# Patient Record
Sex: MALE | Race: WHITE | Employment: FULL TIME | URBAN - METROPOLITAN AREA
[De-identification: names, ages, dates, MRNs, and addresses within clinical notes are randomized per-mention and may not be internally consistent; named-entity substitution may affect disease eponyms.]

---

## 2017-02-17 ENCOUNTER — TELEPHONE (OUTPATIENT)
Dept: FAMILY MEDICINE CLINIC | Age: 57
End: 2017-02-17

## 2017-02-17 ENCOUNTER — OFFICE VISIT (OUTPATIENT)
Dept: FAMILY MEDICINE CLINIC | Age: 57
End: 2017-02-17

## 2017-02-17 VITALS
BODY MASS INDEX: 24.79 KG/M2 | HEIGHT: 72 IN | TEMPERATURE: 97.4 F | WEIGHT: 183 LBS | DIASTOLIC BLOOD PRESSURE: 85 MMHG | HEART RATE: 64 BPM | RESPIRATION RATE: 12 BRPM | SYSTOLIC BLOOD PRESSURE: 130 MMHG | OXYGEN SATURATION: 98 %

## 2017-02-17 DIAGNOSIS — G51.0 BELL'S PALSY: Primary | ICD-10-CM

## 2017-02-17 PROCEDURE — G8427 DOCREV CUR MEDS BY ELIG CLIN: HCPCS | Performed by: FAMILY MEDICINE

## 2017-02-17 PROCEDURE — G8419 CALC BMI OUT NRM PARAM NOF/U: HCPCS | Performed by: FAMILY MEDICINE

## 2017-02-17 PROCEDURE — 1036F TOBACCO NON-USER: CPT | Performed by: FAMILY MEDICINE

## 2017-02-17 PROCEDURE — 3017F COLORECTAL CA SCREEN DOC REV: CPT | Performed by: FAMILY MEDICINE

## 2017-02-17 PROCEDURE — G8484 FLU IMMUNIZE NO ADMIN: HCPCS | Performed by: FAMILY MEDICINE

## 2017-02-17 PROCEDURE — 99213 OFFICE O/P EST LOW 20 MIN: CPT | Performed by: FAMILY MEDICINE

## 2017-02-17 RX ORDER — ACYCLOVIR 400 MG/1
400 TABLET ORAL
Qty: 35 TABLET | Refills: 0 | Status: SHIPPED | OUTPATIENT
Start: 2017-02-17 | End: 2017-02-24

## 2017-02-17 RX ORDER — PREDNISONE 10 MG/1
TABLET ORAL
Qty: 55 TABLET | Refills: 0 | Status: SHIPPED | OUTPATIENT
Start: 2017-02-17 | End: 2018-06-08

## 2017-02-17 RX ORDER — MINERAL OIL, PETROLATUM 425; 568 MG/G; MG/G
1 OINTMENT OPHTHALMIC NIGHTLY PRN
Qty: 1 TUBE | Refills: 3 | Status: SHIPPED | OUTPATIENT
Start: 2017-02-17 | End: 2018-06-08

## 2017-02-17 ASSESSMENT — ENCOUNTER SYMPTOMS
RESPIRATORY NEGATIVE: 1
FACIAL SWELLING: 0
EYE REDNESS: 1
EYE ITCHING: 0
TROUBLE SWALLOWING: 0
EYE DISCHARGE: 0
SINUS PRESSURE: 1
SORE THROAT: 1
RHINORRHEA: 1

## 2017-11-01 ENCOUNTER — OFFICE VISIT (OUTPATIENT)
Dept: FAMILY MEDICINE CLINIC | Age: 57
End: 2017-11-01

## 2017-11-01 VITALS
HEIGHT: 72 IN | TEMPERATURE: 96.3 F | DIASTOLIC BLOOD PRESSURE: 82 MMHG | WEIGHT: 186 LBS | SYSTOLIC BLOOD PRESSURE: 122 MMHG | HEART RATE: 60 BPM | BODY MASS INDEX: 25.19 KG/M2 | RESPIRATION RATE: 15 BRPM

## 2017-11-01 DIAGNOSIS — G50.0 TRIGEMINAL NEURALGIA OF LEFT SIDE OF FACE: Primary | ICD-10-CM

## 2017-11-01 PROCEDURE — 1036F TOBACCO NON-USER: CPT | Performed by: FAMILY MEDICINE

## 2017-11-01 PROCEDURE — G8419 CALC BMI OUT NRM PARAM NOF/U: HCPCS | Performed by: FAMILY MEDICINE

## 2017-11-01 PROCEDURE — G8484 FLU IMMUNIZE NO ADMIN: HCPCS | Performed by: FAMILY MEDICINE

## 2017-11-01 PROCEDURE — G8428 CUR MEDS NOT DOCUMENT: HCPCS | Performed by: FAMILY MEDICINE

## 2017-11-01 PROCEDURE — 3017F COLORECTAL CA SCREEN DOC REV: CPT | Performed by: FAMILY MEDICINE

## 2017-11-01 PROCEDURE — 99213 OFFICE O/P EST LOW 20 MIN: CPT | Performed by: FAMILY MEDICINE

## 2017-11-01 RX ORDER — GABAPENTIN 300 MG/1
300 CAPSULE ORAL 3 TIMES DAILY
Qty: 90 CAPSULE | Refills: 3 | Status: SHIPPED | OUTPATIENT
Start: 2017-11-01 | End: 2018-05-03 | Stop reason: SDUPTHER

## 2017-11-01 ASSESSMENT — ENCOUNTER SYMPTOMS: RESPIRATORY NEGATIVE: 1

## 2017-11-01 NOTE — PROGRESS NOTES
Subjective  Angela Lui, 61 y.o. male presents today with:  Chief Complaint   Patient presents with    Facial Pain       Comes into the office today complaining of increased facial pain and twitching ever since having Bell's palsy on the left side. Review of Systems   Constitutional: Negative. Respiratory: Negative. Cardiovascular: Negative. Musculoskeletal: Negative. Neurological:        Facial twitching and pain-left-sided in same distribution as prior Bell's palsy         Past Medical History:   Diagnosis Date    Headache      Past Surgical History:   Procedure Laterality Date    SPINE SURGERY      TONSILLECTOMY  child     Social History     Social History    Marital status:      Spouse name: N/A    Number of children: N/A    Years of education: N/A     Occupational History    Not on file. Social History Main Topics    Smoking status: Never Smoker    Smokeless tobacco: Not on file    Alcohol use No    Drug use: No    Sexual activity: Not on file     Other Topics Concern    Not on file     Social History Narrative    No narrative on file     Family History   Problem Relation Age of Onset    Cancer Mother      breast    Arthritis Father      No Known Allergies  Current Outpatient Prescriptions on File Prior to Visit   Medication Sig Dispense Refill    predniSONE (DELTASONE) 10 MG tablet Po 4 tabs daily x 5 days then 3d x 5d then 2d x 5d then 1d x 5d then 1/2 daily x 5d then 1/2 every other day x 5 doses then stop 55 tablet 0    Artificial Tear Ointment (REFRESH LACRI-LUBE) OINT ointment Apply 1 inch to eye nightly as needed (dry eye) 1 Tube 3    ibuprofen (ADVIL;MOTRIN) 800 MG tablet Take 800 mg by mouth every 6 hours as needed        No current facility-administered medications on file prior to visit.         Objective    Vitals:    11/01/17 1142   BP: 122/82   Pulse: 60   Resp: 15   Temp: 96.3 °F (35.7 °C)   TempSrc: Tympanic   Weight: 186 lb (84.4 kg)   Height: 5' 11.5\" (1.816 m)     Physical Exam   Constitutional: He is oriented to person, place, and time. He appears well-developed and well-nourished. No distress. HENT:   Head: Normocephalic and atraumatic. Cardiovascular: Normal rate and regular rhythm. Pulmonary/Chest: Effort normal and breath sounds normal.   Abdominal: Soft. Bowel sounds are normal.   Neurological: He is alert and oriented to person, place, and time. Skin: Skin is warm and dry. He is not diaphoretic. Nursing note and vitals reviewed. Assessment & Plan   1. Trigeminal neuralgia of left side of face  gabapentin (NEURONTIN) 300 MG capsule     No orders of the defined types were placed in this encounter. Orders Placed This Encounter   Medications    gabapentin (NEURONTIN) 300 MG capsule     Sig: Take 1 capsule by mouth 3 times daily     Dispense:  90 capsule     Refill:  3   May adjust dosage as necessary. The goal is lowest effective dosage. Keep next regular appointment  There are no discontinued medications. Counseling given: Not Answered      No Follow-up on file.     Gregg Leone DO

## 2018-05-03 DIAGNOSIS — G50.0 TRIGEMINAL NEURALGIA OF LEFT SIDE OF FACE: ICD-10-CM

## 2018-05-03 RX ORDER — GABAPENTIN 300 MG/1
300 CAPSULE ORAL 3 TIMES DAILY
Qty: 90 CAPSULE | Refills: 0 | Status: SHIPPED | OUTPATIENT
Start: 2018-05-03 | End: 2018-07-31 | Stop reason: SDUPTHER

## 2018-06-08 ENCOUNTER — OFFICE VISIT (OUTPATIENT)
Dept: FAMILY MEDICINE CLINIC | Age: 58
End: 2018-06-08
Payer: COMMERCIAL

## 2018-06-08 VITALS
BODY MASS INDEX: 24.52 KG/M2 | RESPIRATION RATE: 12 BRPM | OXYGEN SATURATION: 98 % | WEIGHT: 181 LBS | HEART RATE: 63 BPM | SYSTOLIC BLOOD PRESSURE: 130 MMHG | HEIGHT: 72 IN | TEMPERATURE: 96.2 F | DIASTOLIC BLOOD PRESSURE: 90 MMHG

## 2018-06-08 DIAGNOSIS — Z12.11 COLON CANCER SCREENING: ICD-10-CM

## 2018-06-08 DIAGNOSIS — K40.90 INGUINAL HERNIA OF RIGHT SIDE WITHOUT OBSTRUCTION OR GANGRENE: Primary | ICD-10-CM

## 2018-06-08 PROCEDURE — 1036F TOBACCO NON-USER: CPT | Performed by: FAMILY MEDICINE

## 2018-06-08 PROCEDURE — G8420 CALC BMI NORM PARAMETERS: HCPCS | Performed by: FAMILY MEDICINE

## 2018-06-08 PROCEDURE — G8427 DOCREV CUR MEDS BY ELIG CLIN: HCPCS | Performed by: FAMILY MEDICINE

## 2018-06-08 PROCEDURE — 99213 OFFICE O/P EST LOW 20 MIN: CPT | Performed by: FAMILY MEDICINE

## 2018-06-08 PROCEDURE — 3017F COLORECTAL CA SCREEN DOC REV: CPT | Performed by: FAMILY MEDICINE

## 2018-06-08 ASSESSMENT — PATIENT HEALTH QUESTIONNAIRE - PHQ9
SUM OF ALL RESPONSES TO PHQ9 QUESTIONS 1 & 2: 0
SUM OF ALL RESPONSES TO PHQ QUESTIONS 1-9: 0
2. FEELING DOWN, DEPRESSED OR HOPELESS: 0
1. LITTLE INTEREST OR PLEASURE IN DOING THINGS: 0

## 2018-06-08 ASSESSMENT — ENCOUNTER SYMPTOMS
GASTROINTESTINAL NEGATIVE: 1
RESPIRATORY NEGATIVE: 1

## 2024-02-08 ENCOUNTER — HOSPITAL ENCOUNTER (INPATIENT)
Facility: HOSPITAL | Age: 64
LOS: 5 days | Discharge: HOME | DRG: 373 | End: 2024-02-13
Attending: EMERGENCY MEDICINE | Admitting: SURGERY
Payer: COMMERCIAL

## 2024-02-08 ENCOUNTER — APPOINTMENT (OUTPATIENT)
Dept: RADIOLOGY | Facility: HOSPITAL | Age: 64
DRG: 373 | End: 2024-02-08
Payer: COMMERCIAL

## 2024-02-08 ENCOUNTER — OUTSIDE SERVICES (OUTPATIENT)
Dept: INFECTIOUS DISEASES | Age: 64
End: 2024-02-08

## 2024-02-08 DIAGNOSIS — K35.211 ACUTE APPENDICITIS WITH PERFORATION, GENERALIZED PERITONITIS, AND ABSCESS, WITHOUT GANGRENE: Primary | ICD-10-CM

## 2024-02-08 DIAGNOSIS — K35.211 ACUTE APPENDICITIS WITH PERFORATION, GENERALIZED PERITONITIS, AND ABSCESS, UNSPECIFIED WHETHER GANGRENE PRESENT: ICD-10-CM

## 2024-02-08 DIAGNOSIS — K65.9 PERITONITIS (HCC): Primary | ICD-10-CM

## 2024-02-08 LAB
ALBUMIN SERPL BCP-MCNC: 3.8 G/DL (ref 3.4–5)
ALP SERPL-CCNC: 85 U/L (ref 33–136)
ALT SERPL W P-5'-P-CCNC: 43 U/L (ref 10–52)
ANION GAP SERPL CALC-SCNC: 13 MMOL/L (ref 10–20)
APPEARANCE UR: CLEAR
AST SERPL W P-5'-P-CCNC: 27 U/L (ref 9–39)
BASOPHILS # BLD AUTO: 0.04 X10*3/UL (ref 0–0.1)
BASOPHILS NFR BLD AUTO: 0.2 %
BILIRUB SERPL-MCNC: 1.2 MG/DL (ref 0–1.2)
BILIRUB UR STRIP.AUTO-MCNC: NEGATIVE MG/DL
BUN SERPL-MCNC: 14 MG/DL (ref 6–23)
CALCIUM SERPL-MCNC: 9 MG/DL (ref 8.6–10.3)
CHLORIDE SERPL-SCNC: 94 MMOL/L (ref 98–107)
CO2 SERPL-SCNC: 26 MMOL/L (ref 21–32)
COLOR UR: YELLOW
CREAT SERPL-MCNC: 0.94 MG/DL (ref 0.5–1.3)
EGFRCR SERPLBLD CKD-EPI 2021: >90 ML/MIN/1.73M*2
EOSINOPHIL # BLD AUTO: 0.01 X10*3/UL (ref 0–0.7)
EOSINOPHIL NFR BLD AUTO: 0.1 %
ERYTHROCYTE [DISTWIDTH] IN BLOOD BY AUTOMATED COUNT: 12.2 % (ref 11.5–14.5)
FLUAV RNA RESP QL NAA+PROBE: DETECTED
FLUBV RNA RESP QL NAA+PROBE: NOT DETECTED
GLUCOSE SERPL-MCNC: 104 MG/DL (ref 74–99)
GLUCOSE UR STRIP.AUTO-MCNC: NEGATIVE MG/DL
HCT VFR BLD AUTO: 42.4 % (ref 41–52)
HGB BLD-MCNC: 15.1 G/DL (ref 13.5–17.5)
HOLD SPECIMEN: NORMAL
IMM GRANULOCYTES # BLD AUTO: 0.09 X10*3/UL (ref 0–0.7)
IMM GRANULOCYTES NFR BLD AUTO: 0.5 % (ref 0–0.9)
KETONES UR STRIP.AUTO-MCNC: ABNORMAL MG/DL
LEUKOCYTE ESTERASE UR QL STRIP.AUTO: NEGATIVE
LIPASE SERPL-CCNC: 34 U/L (ref 9–82)
LYMPHOCYTES # BLD AUTO: 0.98 X10*3/UL (ref 1.2–4.8)
LYMPHOCYTES NFR BLD AUTO: 5.9 %
MCH RBC QN AUTO: 30 PG (ref 26–34)
MCHC RBC AUTO-ENTMCNC: 35.6 G/DL (ref 32–36)
MCV RBC AUTO: 84 FL (ref 80–100)
MONOCYTES # BLD AUTO: 1.2 X10*3/UL (ref 0.1–1)
MONOCYTES NFR BLD AUTO: 7.2 %
MUCOUS THREADS #/AREA URNS AUTO: ABNORMAL /LPF
NEUTROPHILS # BLD AUTO: 14.32 X10*3/UL (ref 1.2–7.7)
NEUTROPHILS NFR BLD AUTO: 86.1 %
NITRITE UR QL STRIP.AUTO: NEGATIVE
NRBC BLD-RTO: 0 /100 WBCS (ref 0–0)
PH UR STRIP.AUTO: 6 [PH]
PLATELET # BLD AUTO: 232 X10*3/UL (ref 150–450)
POTASSIUM SERPL-SCNC: 3.6 MMOL/L (ref 3.5–5.3)
PROT SERPL-MCNC: 7.2 G/DL (ref 6.4–8.2)
PROT UR STRIP.AUTO-MCNC: ABNORMAL MG/DL
RBC # BLD AUTO: 5.03 X10*6/UL (ref 4.5–5.9)
RBC # UR STRIP.AUTO: ABNORMAL /UL
RBC #/AREA URNS AUTO: ABNORMAL /HPF
SODIUM SERPL-SCNC: 129 MMOL/L (ref 136–145)
SP GR UR STRIP.AUTO: 1.01
UROBILINOGEN UR STRIP.AUTO-MCNC: <2 MG/DL
WBC # BLD AUTO: 16.6 X10*3/UL (ref 4.4–11.3)
WBC #/AREA URNS AUTO: ABNORMAL /HPF

## 2024-02-08 PROCEDURE — 2500000004 HC RX 250 GENERAL PHARMACY W/ HCPCS (ALT 636 FOR OP/ED): Performed by: EMERGENCY MEDICINE

## 2024-02-08 PROCEDURE — 99222 1ST HOSP IP/OBS MODERATE 55: CPT

## 2024-02-08 PROCEDURE — 74176 CT ABD & PELVIS W/O CONTRAST: CPT | Performed by: RADIOLOGY

## 2024-02-08 PROCEDURE — 99223 1ST HOSP IP/OBS HIGH 75: CPT | Performed by: SURGERY

## 2024-02-08 PROCEDURE — 85025 COMPLETE CBC W/AUTO DIFF WBC: CPT | Performed by: EMERGENCY MEDICINE

## 2024-02-08 PROCEDURE — 81001 URINALYSIS AUTO W/SCOPE: CPT | Performed by: EMERGENCY MEDICINE

## 2024-02-08 PROCEDURE — 36415 COLL VENOUS BLD VENIPUNCTURE: CPT | Performed by: EMERGENCY MEDICINE

## 2024-02-08 PROCEDURE — 87502 INFLUENZA DNA AMP PROBE: CPT | Performed by: INTERNAL MEDICINE

## 2024-02-08 PROCEDURE — 2500000004 HC RX 250 GENERAL PHARMACY W/ HCPCS (ALT 636 FOR OP/ED)

## 2024-02-08 PROCEDURE — 83690 ASSAY OF LIPASE: CPT | Performed by: EMERGENCY MEDICINE

## 2024-02-08 PROCEDURE — 1210000001 HC SEMI-PRIVATE ROOM DAILY

## 2024-02-08 PROCEDURE — 99285 EMERGENCY DEPT VISIT HI MDM: CPT | Mod: 25 | Performed by: EMERGENCY MEDICINE

## 2024-02-08 PROCEDURE — 74176 CT ABD & PELVIS W/O CONTRAST: CPT

## 2024-02-08 PROCEDURE — 96374 THER/PROPH/DIAG INJ IV PUSH: CPT

## 2024-02-08 PROCEDURE — 80053 COMPREHEN METABOLIC PANEL: CPT | Performed by: EMERGENCY MEDICINE

## 2024-02-08 RX ORDER — MORPHINE SULFATE 2 MG/ML
2 INJECTION, SOLUTION INTRAMUSCULAR; INTRAVENOUS EVERY 4 HOURS PRN
Status: DISCONTINUED | OUTPATIENT
Start: 2024-02-08 | End: 2024-02-11

## 2024-02-08 RX ORDER — ACETAMINOPHEN 160 MG/5ML
650 SOLUTION ORAL EVERY 4 HOURS PRN
Status: DISCONTINUED | OUTPATIENT
Start: 2024-02-08 | End: 2024-02-13 | Stop reason: HOSPADM

## 2024-02-08 RX ORDER — ACETAMINOPHEN 650 MG/1
650 SUPPOSITORY RECTAL EVERY 4 HOURS PRN
Status: DISCONTINUED | OUTPATIENT
Start: 2024-02-08 | End: 2024-02-13 | Stop reason: HOSPADM

## 2024-02-08 RX ORDER — ACETAMINOPHEN 325 MG/1
650 TABLET ORAL EVERY 4 HOURS PRN
Status: DISCONTINUED | OUTPATIENT
Start: 2024-02-08 | End: 2024-02-13 | Stop reason: HOSPADM

## 2024-02-08 RX ORDER — ONDANSETRON HYDROCHLORIDE 2 MG/ML
4 INJECTION, SOLUTION INTRAVENOUS ONCE
Status: DISCONTINUED | OUTPATIENT
Start: 2024-02-08 | End: 2024-02-11

## 2024-02-08 RX ORDER — ONDANSETRON 4 MG/1
4 TABLET, ORALLY DISINTEGRATING ORAL EVERY 8 HOURS PRN
Status: DISCONTINUED | OUTPATIENT
Start: 2024-02-08 | End: 2024-02-13 | Stop reason: HOSPADM

## 2024-02-08 RX ORDER — ONDANSETRON HYDROCHLORIDE 2 MG/ML
4 INJECTION, SOLUTION INTRAVENOUS EVERY 8 HOURS PRN
Status: DISCONTINUED | OUTPATIENT
Start: 2024-02-08 | End: 2024-02-13 | Stop reason: HOSPADM

## 2024-02-08 RX ORDER — SODIUM CHLORIDE, SODIUM LACTATE, POTASSIUM CHLORIDE, CALCIUM CHLORIDE 600; 310; 30; 20 MG/100ML; MG/100ML; MG/100ML; MG/100ML
100 INJECTION, SOLUTION INTRAVENOUS CONTINUOUS
Status: DISCONTINUED | OUTPATIENT
Start: 2024-02-08 | End: 2024-02-11

## 2024-02-08 RX ORDER — ACETAMINOPHEN 500 MG
5 TABLET ORAL NIGHTLY PRN
COMMUNITY

## 2024-02-08 RX ORDER — IBUPROFEN 200 MG
800 TABLET ORAL EVERY 6 HOURS PRN
COMMUNITY

## 2024-02-08 RX ORDER — POLYETHYLENE GLYCOL 3350 17 G/17G
17 POWDER, FOR SOLUTION ORAL DAILY PRN
COMMUNITY

## 2024-02-08 RX ORDER — MORPHINE SULFATE 4 MG/ML
4 INJECTION, SOLUTION INTRAMUSCULAR; INTRAVENOUS EVERY 4 HOURS PRN
Status: DISCONTINUED | OUTPATIENT
Start: 2024-02-08 | End: 2024-02-11

## 2024-02-08 RX ORDER — AZITHROMYCIN 250 MG/1
250 TABLET, FILM COATED ORAL DAILY
COMMUNITY
Start: 2024-02-05 | End: 2024-02-13 | Stop reason: HOSPADM

## 2024-02-08 RX ADMIN — SODIUM CHLORIDE 10 ML: 9 INJECTION, SOLUTION INTRAVENOUS at 13:48

## 2024-02-08 RX ADMIN — ACETAMINOPHEN 650 MG: 325 TABLET ORAL at 13:57

## 2024-02-08 RX ADMIN — PIPERACILLIN SODIUM AND TAZOBACTAM SODIUM 3.38 G: 3; .375 INJECTION, SOLUTION INTRAVENOUS at 21:16

## 2024-02-08 RX ADMIN — SODIUM CHLORIDE, POTASSIUM CHLORIDE, SODIUM LACTATE AND CALCIUM CHLORIDE 100 ML/HR: 600; 310; 30; 20 INJECTION, SOLUTION INTRAVENOUS at 13:29

## 2024-02-08 RX ADMIN — PIPERACILLIN SODIUM AND TAZOBACTAM SODIUM 3.38 G: 3; .375 INJECTION, SOLUTION INTRAVENOUS at 13:48

## 2024-02-08 RX ADMIN — SODIUM CHLORIDE 10 ML: 9 INJECTION, SOLUTION INTRAVENOUS at 21:19

## 2024-02-08 RX ADMIN — PIPERACILLIN AND TAZOBACTAM 4.5 G: 4; .5 INJECTION, POWDER, FOR SOLUTION INTRAVENOUS at 09:06

## 2024-02-08 RX ADMIN — ACETAMINOPHEN 650 MG: 325 TABLET ORAL at 21:18

## 2024-02-08 RX ADMIN — SODIUM CHLORIDE 1000 ML: 9 INJECTION, SOLUTION INTRAVENOUS at 08:07

## 2024-02-08 RX ADMIN — SODIUM CHLORIDE, POTASSIUM CHLORIDE, SODIUM LACTATE AND CALCIUM CHLORIDE 100 ML/HR: 600; 310; 30; 20 INJECTION, SOLUTION INTRAVENOUS at 23:44

## 2024-02-08 SDOH — SOCIAL STABILITY: SOCIAL INSECURITY: ABUSE: ADULT

## 2024-02-08 SDOH — SOCIAL STABILITY: SOCIAL INSECURITY: ARE THERE ANY APPARENT SIGNS OF INJURIES/BEHAVIORS THAT COULD BE RELATED TO ABUSE/NEGLECT?: NO

## 2024-02-08 SDOH — SOCIAL STABILITY: SOCIAL INSECURITY: ARE YOU OR HAVE YOU BEEN THREATENED OR ABUSED PHYSICALLY, EMOTIONALLY, OR SEXUALLY BY ANYONE?: NO

## 2024-02-08 SDOH — SOCIAL STABILITY: SOCIAL INSECURITY: HAS ANYONE EVER THREATENED TO HURT YOUR FAMILY OR YOUR PETS?: NO

## 2024-02-08 SDOH — SOCIAL STABILITY: SOCIAL INSECURITY: DO YOU FEEL ANYONE HAS EXPLOITED OR TAKEN ADVANTAGE OF YOU FINANCIALLY OR OF YOUR PERSONAL PROPERTY?: NO

## 2024-02-08 SDOH — SOCIAL STABILITY: SOCIAL INSECURITY: DO YOU FEEL UNSAFE GOING BACK TO THE PLACE WHERE YOU ARE LIVING?: NO

## 2024-02-08 SDOH — SOCIAL STABILITY: SOCIAL INSECURITY: WERE YOU ABLE TO COMPLETE ALL THE BEHAVIORAL HEALTH SCREENINGS?: YES

## 2024-02-08 SDOH — SOCIAL STABILITY: SOCIAL INSECURITY: DOES ANYONE TRY TO KEEP YOU FROM HAVING/CONTACTING OTHER FRIENDS OR DOING THINGS OUTSIDE YOUR HOME?: NO

## 2024-02-08 SDOH — SOCIAL STABILITY: SOCIAL INSECURITY: HAVE YOU HAD THOUGHTS OF HARMING ANYONE ELSE?: NO

## 2024-02-08 ASSESSMENT — ACTIVITIES OF DAILY LIVING (ADL)
WALKS IN HOME: INDEPENDENT
PATIENT'S MEMORY ADEQUATE TO SAFELY COMPLETE DAILY ACTIVITIES?: YES
BATHING: INDEPENDENT
ADEQUATE_TO_COMPLETE_ADL: YES
FEEDING YOURSELF: INDEPENDENT
DRESSING YOURSELF: INDEPENDENT
ADEQUATE_TO_COMPLETE_ADL: YES
WALKS IN HOME: INDEPENDENT
HEARING - RIGHT EAR: FUNCTIONAL
GROOMING: INDEPENDENT
LACK_OF_TRANSPORTATION: NO
HEARING - RIGHT EAR: FUNCTIONAL
HEARING - LEFT EAR: FUNCTIONAL
PATIENT'S MEMORY ADEQUATE TO SAFELY COMPLETE DAILY ACTIVITIES?: YES
FEEDING YOURSELF: INDEPENDENT
BATHING: INDEPENDENT
JUDGMENT_ADEQUATE_SAFELY_COMPLETE_DAILY_ACTIVITIES: YES
GROOMING: INDEPENDENT
JUDGMENT_ADEQUATE_SAFELY_COMPLETE_DAILY_ACTIVITIES: YES
DRESSING YOURSELF: INDEPENDENT
TOILETING: INDEPENDENT
ASSISTIVE_DEVICE: EYEGLASSES
HEARING - LEFT EAR: FUNCTIONAL
ASSISTIVE_DEVICE: EYEGLASSES

## 2024-02-08 ASSESSMENT — LIFESTYLE VARIABLES
AUDIT-C TOTAL SCORE: 1
EVER HAD A DRINK FIRST THING IN THE MORNING TO STEADY YOUR NERVES TO GET RID OF A HANGOVER: NO
PRESCIPTION_ABUSE_PAST_12_MONTHS: NO
HAVE YOU EVER FELT YOU SHOULD CUT DOWN ON YOUR DRINKING: NO
AUDIT-C TOTAL SCORE: 1
HOW OFTEN DO YOU HAVE 6 OR MORE DRINKS ON ONE OCCASION: NEVER
SKIP TO QUESTIONS 9-10: 1
HOW MANY STANDARD DRINKS CONTAINING ALCOHOL DO YOU HAVE ON A TYPICAL DAY: 1 OR 2
SUBSTANCE_ABUSE_PAST_12_MONTHS: NO
HAVE PEOPLE ANNOYED YOU BY CRITICIZING YOUR DRINKING: NO
HOW OFTEN DO YOU HAVE A DRINK CONTAINING ALCOHOL: MONTHLY OR LESS
EVER FELT BAD OR GUILTY ABOUT YOUR DRINKING: NO

## 2024-02-08 ASSESSMENT — PAIN SCALES - GENERAL
PAINLEVEL_OUTOF10: 3
PAINLEVEL_OUTOF10: 3
PAINLEVEL_OUTOF10: 0 - NO PAIN
PAINLEVEL_OUTOF10: 1

## 2024-02-08 ASSESSMENT — COGNITIVE AND FUNCTIONAL STATUS - GENERAL
PATIENT BASELINE BEDBOUND: NO
DAILY ACTIVITIY SCORE: 24
MOBILITY SCORE: 24
MOBILITY SCORE: 24
DAILY ACTIVITIY SCORE: 24

## 2024-02-08 ASSESSMENT — ENCOUNTER SYMPTOMS
APPETITE CHANGE: 1
WEAKNESS: 1
FEVER: 1
DYSURIA: 0
TROUBLE SWALLOWING: 0
SORE THROAT: 0
SHORTNESS OF BREATH: 0
ABDOMINAL PAIN: 1
MYALGIAS: 0
NAUSEA: 1
CONSTIPATION: 1
BLOOD IN STOOL: 0
FATIGUE: 1

## 2024-02-08 ASSESSMENT — PAIN - FUNCTIONAL ASSESSMENT
PAIN_FUNCTIONAL_ASSESSMENT: 0-10

## 2024-02-08 ASSESSMENT — COLUMBIA-SUICIDE SEVERITY RATING SCALE - C-SSRS
1. IN THE PAST MONTH, HAVE YOU WISHED YOU WERE DEAD OR WISHED YOU COULD GO TO SLEEP AND NOT WAKE UP?: NO
2. HAVE YOU ACTUALLY HAD ANY THOUGHTS OF KILLING YOURSELF?: NO
6. HAVE YOU EVER DONE ANYTHING, STARTED TO DO ANYTHING, OR PREPARED TO DO ANYTHING TO END YOUR LIFE?: NO

## 2024-02-08 ASSESSMENT — PATIENT HEALTH QUESTIONNAIRE - PHQ9
2. FEELING DOWN, DEPRESSED OR HOPELESS: NOT AT ALL
SUM OF ALL RESPONSES TO PHQ9 QUESTIONS 1 & 2: 0
1. LITTLE INTEREST OR PLEASURE IN DOING THINGS: NOT AT ALL

## 2024-02-08 ASSESSMENT — PAIN DESCRIPTION - LOCATION: LOCATION: ABDOMEN

## 2024-02-08 NOTE — NURSING NOTE
Order received for drain placement on this pt. CT reveiwed by Dr Arora our radiologist and he d/w Dr Samuel. Agreement made to get get a follow-up CT tomorrow and no drain placement today. Dr Arora states that there is most likely stool and a percutaneous drain most likely would not drain that  type matter.

## 2024-02-08 NOTE — CONSULTS
"Nutrition Initial Assessment:   Nutrition Assessment    Reason for Assessment: Admission nursing screening    Patient is a 63 y.o. male presenting with:      Nutrition History:  Food and Nutrient History: RDN consult for MST score of 2. Pt reports 5 lb wt loss in the past 1 weeks due to poor appetite, nausea and diarrhea. Pt states appetite has been poor for the past 8 days. Stated  lbs.  Pt denies following diet restrictions at home.  Reports ongoing nausea and vomiting. Pt denies difficulty chewing or swallowing. Pt declined offer for clear liquid supplement, will continue to monitor.  Food Allergies/Intolerances:  None  GI Symptoms: Diarrhea and Nausea  Oral Problems: None       Anthropometrics:  Height: 180.3 cm (5' 11\")   Weight: 80.3 kg (177 lb)   BMI (Calculated): 24.7  IBW/kg (Dietitian Calculated): 78.2 kg  Percent of IBW: 103 %       Weight History:   Wt Readings from Last 10 Encounters:   02/08/24 80.3 kg (177 lb)   10/14/22 80.7 kg (178 lb)   10/12/22 80.7 kg (178 lb)   02/22/22 79.9 kg (176 lb 2 oz)   02/09/22 81.8 kg (180 lb 5.4 oz)   01/15/22 81.2 kg (179 lb)   01/11/22 80.5 kg (177 lb 6 oz)   08/04/21 79.9 kg (176 lb 2 oz)   07/07/21 80.9 kg (178 lb 4 oz)   04/02/21 81.6 kg (180 lb)      Weight Change %:  Weight History / % Weight Change: Pt reports 5 lb wt loss x 1 week, current wt reflects 1 lb nonsignificant wt loss from stated UBW    Nutrition Focused Physical Exam Findings:    Subcutaneous Fat Loss:   Orbital Fat Pads: Well nourshed (slightly bulging fat pads)  Buccal Fat Pads: Well nourished (full, rounded cheeks)  Triceps: Well nourished (ample fat tissue)  Muscle Wasting:  Temporalis: Well nourished (well-defined muscle)  Pectoralis (Clavicular Region): Well nourished (clavicle not visible)  Deltoid/Trapezius: Well nourished (rounded appearance at arm, shoulder, neck)  Edema:  Edema: none  Physical Findings:  Skin: Negative    Nutrition Significant Labs:  CBC Trend:   Results from last " 7 days   Lab Units 02/08/24  0803   WBC AUTO x10*3/uL 16.6*   RBC AUTO x10*6/uL 5.03   HEMOGLOBIN g/dL 15.1   HEMATOCRIT % 42.4   MCV fL 84   PLATELETS AUTO x10*3/uL 232    , BMP Trend:   Results from last 7 days   Lab Units 02/08/24  0803   GLUCOSE mg/dL 104*   CALCIUM mg/dL 9.0   SODIUM mmol/L 129*   POTASSIUM mmol/L 3.6   CO2 mmol/L 26   CHLORIDE mmol/L 94*   BUN mg/dL 14   CREATININE mg/dL 0.94    , BG POCT trend:        Nutrition Specific Medications:  reviewed    I/O:    ;          Dietary Orders (From admission, onward)       Start     Ordered    02/08/24 1347  Adult diet Clear Liquid  Diet effective now        Question:  Diet type  Answer:  Clear Liquid    02/08/24 1346                     Estimated Needs:   Total Energy Estimated Needs (kCal): 2000 kCal  Method for Estimating Needs: 25 kcal/kg ABW  Total Protein Estimated Needs (g): 80 g  Method for Estimating Needs: 1 g/kg ABW  Total Fluid Estimated Needs (mL): 2000 mL  Method for Estimating Needs: 1 ml/kcal or per MD        Nutrition Diagnosis   Malnutrition Diagnosis  Patient has Malnutrition Diagnosis: No    Nutrition Diagnosis  Patient has Nutrition Diagnosis: Yes  Diagnosis Status (1): New  Nutrition Diagnosis 1: Altered GI function  Related to (1): acute appendicitis with perforation  As Evidenced by (1): nausea, diarrhea, need for clear liquid diet       Nutrition Interventions/Recommendations         Nutrition Prescription:  Individualized Nutrition Prescription Provided for : Continue clear liquids, advance diet to regular per surgery.        Nutrition Interventions:   Food and/or Nutrient Delivery Interventions  Interventions: Meals and snacks  Goal: Consumes 3 meals per day    Coordination of Nutrition Care by a Nutrition Professional  Collaboration and Referral of Nutrition Care: Collaboration by nutrition professional with other providers    Nutrition Education:   No needs at this time       Nutrition Monitoring and Evaluation   Food/Nutrient  Related History Monitoring  Monitoring and Evaluation Plan: Energy intake, Amount of food, Fluid intake  Energy Intake: Estimated energy intake  Criteria: Pt meets >75% of estimated energy needs  Fluid Intake: Estimated fluid intake  Criteria: fluid intake to meet >75% of estimated need  Amount of Food: Estimated amout of food, Medical food intake  Criteria: Pt consumes >75% of meals    Body Composition/Growth/Weight History  Monitoring and Evaluation Plan: Weight  Weight: Measured weight  Criteria: Maintains stable weight    Biochemical Data, Medical Tests and Procedures  Monitoring and Evaluation Plan: Glucose/endocrine profile  Glucose/Endocrine Profile: Glucose, casual  Criteria: BG within desirable range            Time Spent/Follow-up Reminder:   Time Spent (min): 30 minutes  Last Date of Nutrition Visit: 02/08/24  Nutrition Follow-Up Needed?: 5-7 days

## 2024-02-08 NOTE — CARE PLAN
Problem: Pain - Adult  Goal: Verbalizes/displays adequate comfort level or baseline comfort level  2/8/2024 1823 by Jeane Jacome RN  Outcome: Progressing  2/8/2024 1204 by Jeane Jacome RN  Flowsheets (Taken 2/8/2024 1204)  Verbalizes/displays adequate comfort level or baseline comfort level:   Encourage patient to monitor pain and request assistance   Assess pain using appropriate pain scale   Administer analgesics based on type and severity of pain and evaluate response   Implement non-pharmacological measures as appropriate and evaluate response   Consider cultural and social influences on pain and pain management   Notify Licensed Independent Practitioner if interventions unsuccessful or patient reports new pain     Problem: Safety - Adult  Goal: Free from fall injury  2/8/2024 1823 by Jeane Jacome RN  Outcome: Progressing  2/8/2024 1204 by Jeane Jacome RN  Flowsheets (Taken 2/8/2024 1204)  Free from fall injury: Instruct family/caregiver on patient safety     Problem: Discharge Planning  Goal: Discharge to home or other facility with appropriate resources  2/8/2024 1823 by Jeane Jacome RN  Outcome: Progressing  2/8/2024 1204 by Jeane Jacome RN  Flowsheets (Taken 2/8/2024 1204)  Discharge to home or other facility with appropriate resources:   Identify barriers to discharge with patient and caregiver   Arrange for needed discharge resources and transportation as appropriate   Identify discharge learning needs (meds, wound care, etc)   Refer to discharge planning if patient needs post-hospital services based on physician order or complex needs related to functional status, cognitive ability or social support system     Problem: Chronic Conditions and Co-morbidities  Goal: Patient's chronic conditions and co-morbidity symptoms are monitored and maintained or improved  2/8/2024 1823 by Jeane Jacome RN  Outcome: Progressing  2/8/2024 1204 by Jeane Jacome RN  Flowsheets (Taken  2/8/2024 1204)  Care Plan - Patient's Chronic Conditions and Co-Morbidity Symptoms are Monitored and Maintained or Improved:   Monitor and assess patient's chronic conditions and comorbid symptoms for stability, deterioration, or improvement   Collaborate with multidisciplinary team to address chronic and comorbid conditions and prevent exacerbation or deterioration   Update acute care plan with appropriate goals if chronic or comorbid symptoms are exacerbated and prevent overall improvement and discharge     Problem: Nutrition  Goal: Less than 5 days NPO/clear liquids  Outcome: Progressing  Goal: Oral intake greater 75%  Outcome: Progressing  Goal: Consume prescribed supplement  Outcome: Progressing  Goal: Adequate PO fluid intake  Outcome: Progressing  Goal: Lab values WNL  Outcome: Progressing  Goal: Electrolytes WNL  Outcome: Progressing  Goal: Maintain stable weight  Outcome: Progressing   The patient's goals for the shift include      The clinical goals for the shift include Pain will be managed to a tolerable level.    Over the shift, the patient did  make progress toward care plan goals.

## 2024-02-08 NOTE — H&P
General Surgery History and Physical  Patient: Tacho Conway  Unit/Bed: 1109/1109-B  YOB: 1960  MRN: [id number]  Acct: [id number]   Admitting Diagnosis: Acute appendicitis with perforation, generalized peritonitis, and abscess, without gangrene [K35.211]  Date:  2/8/2024  Hospital Day: 0  Attending: Chiki Samuel MD    Complaint:  Chief Complaint   Patient presents with    Abdominal Pain     +Flu Sunday, but not eating, no appetite       History of Present Illness:  63-year-old male presented to the ER with abdominal pain and constipation.  He states he became sick on Friday (6 days ago) with shortness of breath, rhinorrhea, subjective fevers, fatigue, and decreased appetite, and on Sunday was diagnosed with influenza A.  He was started on Zithromax.  On Sunday or Monday he developed RLQ abdominal pain and nausea.  He initially thought the pain was from his history of right inguinal hernia repair.  Much of the flu symptoms have resolved other than continued decreased appetite, fatigue, and fevers.  He also feels constipated, last bowel movement 2 days ago.  The pain is constant and was worse when his daughter jumped on his stomach.  He has no other history of abdominal surgeries.  He has no medical problems and takes no medications.    In the ER, CBC showed WBC of 16.6 and CMP showed sodium of 129, otherwise labs unremarkable.  CT abdomen pelvis showed appendicitis with perforation and abscess in the right lower quadrant.  The patient was kept n.p.o. and started on IV antibiotics.  General surgery was consulted for admission to their service.  Allergies:  No Known Allergies   PMHx:  Past Medical History:   Diagnosis Date    Personal history of other diseases of the nervous system and sense organs     History of Bell's palsy     PSHx:  Past Surgical History:   Procedure Laterality Date    OTHER SURGICAL HISTORY  03/30/2021    Microlaminectomy   Inguinal hernia repair (right) about 3 years  ago  Vasectomy    Social Hx:  Social History     Socioeconomic History    Marital status:      Spouse name: Not on file    Number of children: Not on file    Years of education: Not on file    Highest education level: Not on file   Occupational History    Not on file   Tobacco Use    Smoking status: Never    Smokeless tobacco: Never   Vaping Use    Vaping Use: Never used   Substance and Sexual Activity    Alcohol use: Not Currently     Comment: Drinks monthly or less    Drug use: Never    Sexual activity: Never   Other Topics Concern    Not on file   Social History Narrative    Not on file     Social Determinants of Health     Financial Resource Strain: Low Risk  (2/8/2024)    Overall Financial Resource Strain (CARDIA)     Difficulty of Paying Living Expenses: Not hard at all   Food Insecurity: Not on file   Transportation Needs: No Transportation Needs (2/8/2024)    PRAPARE - Transportation     Lack of Transportation (Medical): No     Lack of Transportation (Non-Medical): No   Physical Activity: Not on file   Stress: Not on file   Social Connections: Not on file   Intimate Partner Violence: Not on file   Housing Stability: Low Risk  (2/8/2024)    Housing Stability Vital Sign     Unable to Pay for Housing in the Last Year: No     Number of Places Lived in the Last Year: 1     Unstable Housing in the Last Year: No     Family Hx:  Father - COPD  Review of Systems:   Review of Systems   Constitutional:  Positive for appetite change, fatigue and fever.   HENT:  Negative for sore throat and trouble swallowing.    Eyes:  Negative for visual disturbance.   Respiratory:  Negative for shortness of breath.    Cardiovascular:  Negative for chest pain and leg swelling.   Gastrointestinal:  Positive for abdominal pain, constipation and nausea. Negative for blood in stool.   Genitourinary:  Negative for dysuria.   Musculoskeletal:  Negative for myalgias.   Skin:  Negative for rash.   Neurological:  Positive for weakness  (generalized).     Physical Examination:    Visit Vitals  /63   Pulse 72   Temp 36.6 °C (97.9 °F) (Oral)   Resp 18      Physical Exam  Constitutional:       General: He is not in acute distress.  HENT:      Head: Normocephalic and atraumatic.      Mouth/Throat:      Mouth: Mucous membranes are moist.   Eyes:      Conjunctiva/sclera: Conjunctivae normal.   Cardiovascular:      Rate and Rhythm: Normal rate and regular rhythm.   Pulmonary:      Effort: Pulmonary effort is normal. No respiratory distress.      Breath sounds: Normal breath sounds.   Abdominal:      General: Abdomen is flat. There is no distension.      Palpations: Abdomen is soft.      Tenderness: There is abdominal tenderness (RLQ). There is no guarding.   Musculoskeletal:         General: No swelling.      Right lower leg: No edema.      Left lower leg: No edema.   Skin:     General: Skin is warm and dry.      Capillary Refill: Capillary refill takes less than 2 seconds.   Neurological:      Mental Status: He is alert.   Psychiatric:         Mood and Affect: Mood normal.         Behavior: Behavior normal.       LABS:  CBC:   Lab Results   Component Value Date    WBC 16.6 (H) 02/08/2024    RBC 5.03 02/08/2024    HGB 15.1 02/08/2024    HCT 42.4 02/08/2024    MCV 84 02/08/2024    MCH 30.0 02/08/2024    MCHC 35.6 02/08/2024    RDW 12.2 02/08/2024     02/08/2024     CBC with Differential:    Lab Results   Component Value Date    WBC 16.6 (H) 02/08/2024    RBC 5.03 02/08/2024    HGB 15.1 02/08/2024    HCT 42.4 02/08/2024     02/08/2024    MCV 84 02/08/2024    MCH 30.0 02/08/2024    MCHC 35.6 02/08/2024    RDW 12.2 02/08/2024    NRBC 0.0 02/08/2024    LYMPHOPCT 5.9 02/08/2024    MONOPCT 7.2 02/08/2024    EOSPCT 0.1 02/08/2024    BASOPCT 0.2 02/08/2024    MONOSABS 1.20 (H) 02/08/2024    LYMPHSABS 0.98 (L) 02/08/2024    EOSABS 0.01 02/08/2024    BASOSABS 0.04 02/08/2024     CMP:    Lab Results   Component Value Date     (L) 02/08/2024  "   K 3.6 02/08/2024    CL 94 (L) 02/08/2024    CO2 26 02/08/2024    BUN 14 02/08/2024    CREATININE 0.94 02/08/2024    GLUCOSE 104 (H) 02/08/2024    PROT 7.2 02/08/2024    CALCIUM 9.0 02/08/2024    BILITOT 1.2 02/08/2024    ALKPHOS 85 02/08/2024    AST 27 02/08/2024    ALT 43 02/08/2024     BMP:    Lab Results   Component Value Date     (L) 02/08/2024    K 3.6 02/08/2024    CL 94 (L) 02/08/2024    CO2 26 02/08/2024    BUN 14 02/08/2024    CREATININE 0.94 02/08/2024    CALCIUM 9.0 02/08/2024    GLUCOSE 104 (H) 02/08/2024     Magnesium:No results found for: \"MG\"  Troponin:  No results found for: \"TROPHS\"  Lipid Panel:  No results found for: \"HDL\", \"CHHDL\", \"VLDL\", \"TRIG\", \"NHDL\"   Current Medications:    Current Facility-Administered Medications:     acetaminophen (Tylenol) tablet 650 mg, 650 mg, oral, q4h PRN **OR** acetaminophen (Tylenol) oral liquid 650 mg, 650 mg, nasogastric tube, q4h PRN **OR** acetaminophen (Tylenol) suppository 650 mg, 650 mg, rectal, q4h PRN, Emiliana Gomez PA-C    acetaminophen (Tylenol) tablet 650 mg, 650 mg, oral, q4h PRN, 650 mg at 02/08/24 1357 **OR** acetaminophen (Tylenol) oral liquid 650 mg, 650 mg, oral, q4h PRN **OR** acetaminophen (Tylenol) suppository 650 mg, 650 mg, rectal, q4h PRN, Emiliana Gomez PA-C    HYDROmorphone PF (Dilaudid) injection 0.2 mg, 0.2 mg, intravenous, q3h PRN, Emiliana Gomez PA-C    lactated Ringer's infusion, 100 mL/hr, intravenous, Continuous, Emiliana Gomez PA-C, Last Rate: 100 mL/hr at 02/08/24 1510, 100 mL/hr at 02/08/24 1510    morphine injection 2 mg, 2 mg, intravenous, q4h PRN, Emiliana Gomez PA-C    morphine injection 4 mg, 4 mg, intravenous, q4h PRN, Emiliana Gomez PA-C    ondansetron (Zofran) injection 4 mg, 4 mg, intravenous, Once, Tacho CALHOUN MD    ondansetron ODT (Zofran-ODT) disintegrating tablet 4 mg, 4 mg, oral, q8h PRN **OR** ondansetron (Zofran) injection 4 mg, 4 mg, intravenous, q8h PRN, Emiliana Gomez PA-C    " piperacillin-tazobactam-dextrose (Zosyn) IV 3.375 g, 3.375 g, intravenous, q8h, Last Rate: 12.5 mL/hr at 02/08/24 1348, 3.375 g at 02/08/24 1348 **AND** sodium chloride 0.9 % bolus 10 mL, 10 mL, intravenous, q8h, Emiliana Gomez PA-C, Stopped at 02/08/24 1448  CT abdomen pelvis wo IV contrast    Result Date: 2/8/2024  Interpreted By:  Harper Witt, STUDY: CT ABDOMEN PELVIS WO IV CONTRAST;  2/8/2024 8:28 am   INDICATION: Signs/Symptoms:RLQ pain; hx of R inguinal hernia repair 2y ago, R/O appy.   COMPARISON: None.   ACCESSION NUMBER(S): [id number]   ORDERING CLINICIAN: ABISAI MATAMOROS   TECHNIQUE: CT of the abdomen and pelvis was performed. No oral, no intravenous contrast.   FINDINGS: LOWER CHEST: Images of the lung bases show no infiltrate or pleural fluid. There is mild bibasilar scarring   ABDOMEN:   LIVER: There is no hepatic mass.   BILE DUCTS: There is no intrahepatic, common hepatic or common bile ductal dilatation.   GALLBLADDER: The gallbladder is unremarkable.   PANCREAS: The pancreas is unremarkable.   SPLEEN: The spleen is unremarkable. There is no splenic mass or splenomegaly.   ADRENAL GLANDS: The adrenal glands are unremarkable.   KIDNEYS AND URETERS: The kidneys demonstrate no mass.  There is no intrarenal calculus or hydronephrosis.   BOWEL: There is marked haziness of the fat in the right lower quadrant. A normal appendix is not seen. There is a 7.6 x 4.7 x 3.1 cm fluid collection in the right lower quadrant containing dots of air. There is hyperdense material perhaps fecaliths from the appendiceal lumen. Findings are consistent with perforated appendicitis.   VESSELS: There is atherosclerotic calcification of the abdominal aorta.   PERITONEUM/RETROPERITONEUM/LYMPH NODES: There is no retroperitoneal or pelvic adenopathy.  There is no ascites.   ABDOMINAL WALL: The abdominal wall is unremarkable.   BONE AND SOFT TISSUE: There is no acute osseous finding.   There is no soft tissue abnormality.        1. Appendicitis with perforation. There is a 7.6 x 4.7 x 3.1 cm abscess in the right lower quadrant. 2. Otherwise unremarkable CT abdomen pelvis.   MACRO: Harper Miryam discussed the significance and urgency of this critical finding by secure chat with  ABISAI MATAMOROS on 2/8/2024 at 8:55 am.  (**-RCF-**) Findings:  See findings.   Signed by: Harper Witt 2/8/2024 8:55 AM Dictation workstation:   [id number]        Assessment:    63-year-old male with no medical problems presented to the ER with abdominal pain and constipation.  He was diagnosed with the flu after onset of symptoms on Friday.  Flu symptoms mostly resolved, although he continued to have decreased appetite, fatigue, and fevers.  On Sunday or Monday he developed RLQ abdominal pain and nausea.  He also complains of constipation, last bowel movement 2 days ago.  He has history of right inguinal hernia repair, no other abdominal surgeries.  On exam, he has RLQ tenderness to palpation, in no acute distress.  CT abdomen and pelvis in the ER showed appendicitis with perforation and abscess in the right lower quadrant.  WBC 16.6.    Plan:  -Clear liquid diet  -IV Zosyn  -ID consulted for antibiotic management  -Interventional Radiology consulted for drain for abscess. Radiologist d/w Dr. Samuel and agreed to get CT abdomen/pelvis with IV contrast tomorrow AM for further evaluation, and no drain placement today.  -IVF  -Pain control  -Nausea control  -DVT prophylaxis- SCDs  -Encourage ambulation     Further recommendations per Dr. Samuel    Time spent  60  minutes obtaining labs, imaging, recommendations, interview, assessment, examination, medication review/ordering, and EMR review.    Plan of care was discussed extensively with patient. Patient verbalized understanding through teach back method. All questions and concerns addressed upon examination.     Of note, this documentation is completed using the Dragon Dictation system (voice recognition  software). There may be spelling and/or grammatical errors that were not corrected prior to final submission.    Electronically signed by Emiliana Gomez PA-C on 2/8/2024 at 4:02 PM

## 2024-02-08 NOTE — ED PROVIDER NOTES
HPI   Chief Complaint   Patient presents with    Abdominal Pain     +Flu Sunday, but not eating, no appetite          History provided by:  Patient  History of Present Illness:  63-year-old male presents with 8-day history of limited bowel movement.  He states that he was diagnosed with influenza at an outside facility.  He has not been eating and has not had much appetite.  He tried some over-the-counter constipation medications and did have some bowel movement but did not feel that was enough.  He then noticed that he had right lower abdominal pain.  It is associated with nausea.  No vomiting or diarrhea.  No hematemesis, hematochezia or melena.  No fever or chills.  No dysuria or hematuria.  No trauma or travel.  No sick contacts.  No bad food exposures.      PMFSH:   As per HPI, otherwise nurses notes reviewed in EMR.    Past Medical History:   Active Ambulatory Problems     Diagnosis Date Noted    No Active Ambulatory Problems     Resolved Ambulatory Problems     Diagnosis Date Noted    No Resolved Ambulatory Problems     Past Medical History:   Diagnosis Date    Personal history of other diseases of the nervous system and sense organs       Past Surgical History:   Past Surgical History:   Procedure Laterality Date    OTHER SURGICAL HISTORY  03/30/2021    Microlaminectomy      Family History: No family history on file.   Social History:    Social History     Socioeconomic History    Marital status:      Spouse name: Not on file    Number of children: Not on file    Years of education: Not on file    Highest education level: Not on file   Occupational History    Not on file   Tobacco Use    Smoking status: Not on file    Smokeless tobacco: Not on file   Substance and Sexual Activity    Alcohol use: Not on file    Drug use: Not on file    Sexual activity: Not on file   Other Topics Concern    Not on file   Social History Narrative    Not on file     Social Determinants of Health     Financial Resource  Strain: Not on file   Food Insecurity: Not on file   Transportation Needs: Not on file   Physical Activity: Not on file   Stress: Not on file   Social Connections: Not on file   Intimate Partner Violence: Not on file   Housing Stability: Not on file                          No data recorded                   Patient History   Past Medical History:   Diagnosis Date    Personal history of other diseases of the nervous system and sense organs     History of Bell's palsy     Past Surgical History:   Procedure Laterality Date    OTHER SURGICAL HISTORY  03/30/2021    Microlaminectomy     No family history on file.  Social History     Tobacco Use    Smoking status: Not on file    Smokeless tobacco: Not on file   Substance Use Topics    Alcohol use: Not on file    Drug use: Not on file       Physical Exam   ED Triage Vitals [02/08/24 0752]   Temperature Heart Rate Respirations BP   36.6 °C (97.9 °F) 79 20 138/70      Pulse Ox Temp Source Heart Rate Source Patient Position   98 % Oral -- --      BP Location FiO2 (%)     Right arm --       Physical Exam  Physical Exam:    ED Triage Vitals [02/08/24 0752]   Temperature Heart Rate Respirations BP   36.6 °C (97.9 °F) 79 20 138/70      Pulse Ox Temp Source Heart Rate Source Patient Position   98 % Oral -- --      BP Location FiO2 (%)     Right arm --         Constitutional: Vital signs per nursing notes.  Well developed, well nourished.  No acute distress.    Psychiatric: alert and oriented to person, place, and time; no abnormalities of mood or affect; memory intact  Eyes: PERRL; conjunctivae and lids normal; EOMI  Respiratory: normal respiratory effort and excursion; no rales, rhonchi, or wheezes; equal air entry  Cardiovascular: regular rate and rhythm; no murmurs, rubs or gallops; symmetric pulses; no edema; normal capillary refill; distal pulses present  Neurological: normal speech; CN II-XII grossly intact; normal motor and sensory function; no nystagmus; no pronator  drift  GI: no masses, rebound or guarding; no palpable, pulsatile mass; no organomegaly; no hernia; normal bowel sounds; (-) Carrizales´s sign; (-) McBurney´s sign; (-) CVA tenderness; except tenderness to palpation to the right lower quadrant  Lymphatic: no adenopathy of neck  Musculoskeletal: normal gait and station; normal digits and nails; no gross tendon or ligament injury; normal to palpation; normal strength/tone; neurovascular status intact; (-) Raquel´s sign  Skin: normal to inspection; normal to palpation; no rash  GCS: 15    ED Course & MDM   Diagnoses as of 02/08/24 0903   Acute appendicitis with perforation, generalized peritonitis, and abscess, without gangrene       Medical Decision Making  Medical Decision Making:    Differential Diagnoses Considered: Appendicitis, renal colic, bowel obstruction, dehydration     EKG:    Labs Reviewed   CBC WITH AUTO DIFFERENTIAL - Abnormal       Result Value    WBC 16.6 (*)     nRBC 0.0      RBC 5.03      Hemoglobin 15.1      Hematocrit 42.4      MCV 84      MCH 30.0      MCHC 35.6      RDW 12.2      Platelets 232      Neutrophils % 86.1      Immature Granulocytes %, Automated 0.5      Lymphocytes % 5.9      Monocytes % 7.2      Eosinophils % 0.1      Basophils % 0.2      Neutrophils Absolute 14.32 (*)     Immature Granulocytes Absolute, Automated 0.09      Lymphocytes Absolute 0.98 (*)     Monocytes Absolute 1.20 (*)     Eosinophils Absolute 0.01      Basophils Absolute 0.04     COMPREHENSIVE METABOLIC PANEL - Abnormal    Glucose 104 (*)     Sodium 129 (*)     Potassium 3.6      Chloride 94 (*)     Bicarbonate 26      Anion Gap 13      Urea Nitrogen 14      Creatinine 0.94      eGFR >90      Calcium 9.0      Albumin 3.8      Alkaline Phosphatase 85      Total Protein 7.2      AST 27      Bilirubin, Total 1.2      ALT 43     LIPASE - Normal    Lipase 34      Narrative:     Venipuncture immediately after or during the administration of Metamizole may lead to falsely low  results. Testing should be performed immediately prior to Metamizole dosing.   URINALYSIS WITH REFLEX CULTURE AND MICROSCOPIC    Narrative:     The following orders were created for panel order Urinalysis with Reflex Culture and Microscopic.  Procedure                               Abnormality         Status                     ---------                               -----------         ------                     Urinalysis with Reflex C...[[id number]]                                                 Extra Urine Gray Tube[[id number]]                                                         Please view results for these tests on the individual orders.   URINALYSIS WITH REFLEX CULTURE AND MICROSCOPIC   EXTRA URINE GRAY TUBE       CT abdomen pelvis wo IV contrast   Final Result   1. Appendicitis with perforation. There is a 7.6 x 4.7 x 3.1 cm   abscess in the right lower quadrant.   2. Otherwise unremarkable CT abdomen pelvis.        MACRO:   Harper Witt discussed the significance and urgency of this   critical finding by secure chat with  ABISAI MATAMOROS on 2/8/2024 at   8:55 am.  (**-RCF-**) Findings:  See findings.        Signed by: Harper Witt 2/8/2024 8:55 AM   Dictation workstation:   [id number]          Diagnostic testing considered:         Review of recent and relevant records:    ED Medication Administration:   ED Medication Administration from 02/08/2024 0747 to 02/08/2024 0903         Date/Time Order Dose Route Action Action by     02/08/2024 0807 EST sodium chloride 0.9 % bolus 1,000 mL 1,000 mL intravenous New MARIELLE Concepcion            Prescription Medication Consideration/Given:     Social Determinants of Health Significantly Affecting Care:      Summary:    BP      Temp      Pulse     Resp      SpO2        Abnormal Labs Reviewed   CBC WITH AUTO DIFFERENTIAL - Abnormal; Notable for the following components:       Result Value    WBC 16.6 (*)     Neutrophils Absolute 14.32 (*)     Lymphocytes Absolute  0.98 (*)     Monocytes Absolute 1.20 (*)     All other components within normal limits   COMPREHENSIVE METABOLIC PANEL - Abnormal; Notable for the following components:    Glucose 104 (*)     Sodium 129 (*)     Chloride 94 (*)     All other components within normal limits     Diagnostic evaluation was completed.  Lipase in the normal range so do not suspect pancreatitis.  Metabolic panel is unremarkable except a slightly low sodium at 129.  Potassium is in the normal range.  BUN and creatinine are in the normal range so renal function is normal.  Liver function is normal.  CBC shows an elevated white blood cell count which is consistent with an infectious or inflammatory process.  There is no evidence of anemia with a hemoglobin of 15.1 and I do not suspect acute blood loss.  CT abdomen pelvis shows appendicitis with perforation.  There is an abscess in the right lower quadrant.    The patient was kept NPO.  He was also started on IV antibiotics.    I discussed the results and plan for hospitalization with the patient and/or family/friend if present.  Questions were addressed.  Patient and/or family/friend expressed understanding.    The patient's condition requires ongoing treatment and evaluation necessitating hospital admission.  I have reviewed the patient's history, physical exam, and test information with the admitting physician,    surgeon, Dr. Samuel               , who agrees to hospitalize the patient.         Diagnoses as of 02/08/24 0903   Acute appendicitis with perforation, generalized peritonitis, and abscess, without gangrene         Procedure  Procedures     Tacho CALHOUN MD  02/08/24 0903

## 2024-02-08 NOTE — CONSULTS
Consults      ID Consult: peritonitis    63-year-old male with RLQ abdominal pain that worsened progressively. CT abdomen and pelvis without contrast showed appendicitis with perforation and abscess ([id number]) in the right lower quadrant.    Admitted for acute appendicitis with perforation and abscess.     Started empirically on IV Zosyn and surgery following. Percutaneous drainage pending    Noticed a cough - patient states that he tested positive for Influenza A through [facility] urgent care on Sunday. I see that he was diagnosed with viral bronchitis per care everywhere and given Azithromycin on 2/4/2024. I am not able to see the result.     All 14 ROS discussed with the patient and negative other than as stated as above       has a past medical history of Personal history of other diseases of the nervous system and sense organs.     has a past surgical history that includes Other surgical history (03/30/2021).     reports that he has never smoked. He has never used smokeless tobacco. He reports that he does not currently use alcohol. He reports that he does not use drugs.    Physical exam  Vitals:    02/08/24 1435   BP: 111/63   Pulse: 72   Resp:    Temp:    SpO2: 95%     Dry hacking cough  Patient is awake and alert  NAD  Neck supple  Heart S1S2  Chest: Equal expansion, bilaterally clear to auscultation  Abdomen: soft, ND, NTTP, no guarding  Extrem: no pain to palpation  Skin: no rashes, no diaphoresis  Neuro: CNS intact  Affect appropriate and patient is interactive    Admission on 02/08/2024   Component Date Value Ref Range Status    WBC 02/08/2024 16.6 (H)  4.4 - 11.3 x10*3/uL Final    nRBC 02/08/2024 0.0  0.0 - 0.0 /100 WBCs Final    RBC 02/08/2024 5.03  4.50 - 5.90 x10*6/uL Final    Hemoglobin 02/08/2024 15.1  13.5 - 17.5 g/dL Final    Hematocrit 02/08/2024 42.4  41.0 - 52.0 % Final    MCV 02/08/2024 84  80 - 100 fL Final    MCH 02/08/2024 30.0  26.0 - 34.0 pg Final    MCHC 02/08/2024 35.6  32.0 - 36.0 g/dL Final     RDW 02/08/2024 12.2  11.5 - 14.5 % Final    Platelets 02/08/2024 232  150 - 450 x10*3/uL Final    Neutrophils % 02/08/2024 86.1  40.0 - 80.0 % Final    Immature Granulocytes %, Automated 02/08/2024 0.5  0.0 - 0.9 % Final    Immature Granulocyte Count (IG) includes promyelocytes, myelocytes and metamyelocytes but does not include bands. Percent differential counts (%) should be interpreted in the context of the absolute cell counts (cells/UL).    Lymphocytes % 02/08/2024 5.9  13.0 - 44.0 % Final    Monocytes % 02/08/2024 7.2  2.0 - 10.0 % Final    Eosinophils % 02/08/2024 0.1  0.0 - 6.0 % Final    Basophils % 02/08/2024 0.2  0.0 - 2.0 % Final    Neutrophils Absolute 02/08/2024 14.32 (H)  1.20 - 7.70 x10*3/uL Final    Percent differential counts (%) should be interpreted in the context of the absolute cell counts (cells/uL).    Immature Granulocytes Absolute, Au* 02/08/2024 0.09  0.00 - 0.70 x10*3/uL Final    Lymphocytes Absolute 02/08/2024 0.98 (L)  1.20 - 4.80 x10*3/uL Final    Monocytes Absolute 02/08/2024 1.20 (H)  0.10 - 1.00 x10*3/uL Final    Eosinophils Absolute 02/08/2024 0.01  0.00 - 0.70 x10*3/uL Final    Basophils Absolute 02/08/2024 0.04  0.00 - 0.10 x10*3/uL Final    Glucose 02/08/2024 104 (H)  74 - 99 mg/dL Final    Sodium 02/08/2024 129 (L)  136 - 145 mmol/L Final    Potassium 02/08/2024 3.6  3.5 - 5.3 mmol/L Final    Chloride 02/08/2024 94 (L)  98 - 107 mmol/L Final    Bicarbonate 02/08/2024 26  21 - 32 mmol/L Final    Anion Gap 02/08/2024 13  10 - 20 mmol/L Final    Urea Nitrogen 02/08/2024 14  6 - 23 mg/dL Final    Creatinine 02/08/2024 0.94  0.50 - 1.30 mg/dL Final    eGFR 02/08/2024 >90  >60 mL/min/1.73m*2 Final    Calculations of estimated GFR are performed using the 2021 CKD-EPI Study Refit equation without the race variable for the IDMS-Traceable creatinine methods.  https://jasn.asnjournals.org/content/early/2021/09/22/ASN.7199640289    Calcium 02/08/2024 9.0  8.6 - 10.3 mg/dL Final     Albumin 02/08/2024 3.8  3.4 - 5.0 g/dL Final    Alkaline Phosphatase 02/08/2024 85  33 - 136 U/L Final    Total Protein 02/08/2024 7.2  6.4 - 8.2 g/dL Final    AST 02/08/2024 27  9 - 39 U/L Final    Bilirubin, Total 02/08/2024 1.2  0.0 - 1.2 mg/dL Final    ALT 02/08/2024 43  10 - 52 U/L Final    Patients treated with Sulfasalazine may generate falsely decreased results for ALT.    Lipase 02/08/2024 34  9 - 82 U/L Final    Color, Urine 02/08/2024 Yellow  Straw, Yellow Final    Appearance, Urine 02/08/2024 Clear  Clear Final    Specific Gravity, Urine 02/08/2024 1.012  1.005 - 1.035 Final    pH, Urine 02/08/2024 6.0  5.0, 5.5, 6.0, 6.5, 7.0, 7.5, 8.0 Final    Protein, Urine 02/08/2024 30 (1+) (N)  NEGATIVE mg/dL Final    Glucose, Urine 02/08/2024 NEGATIVE  NEGATIVE mg/dL Final    Blood, Urine 02/08/2024 MODERATE (2+) (A)  NEGATIVE Final    Ketones, Urine 02/08/2024 20 (1+) (A)  NEGATIVE mg/dL Final    Bilirubin, Urine 02/08/2024 NEGATIVE  NEGATIVE Final    Urobilinogen, Urine 02/08/2024 <2.0  <2.0 mg/dL Final    Nitrite, Urine 02/08/2024 NEGATIVE  NEGATIVE Final    Leukocyte Esterase, Urine 02/08/2024 NEGATIVE  NEGATIVE Final    WBC, Urine 02/08/2024 1-5  1-5, NONE /HPF Final    RBC, Urine 02/08/2024 11-20 (A)  NONE, 1-2, 3-5 /HPF Final    Mucus, Urine 02/08/2024 1+  Reference range not established. /LPF Final       CT abdomen and Pelvis  IMPRESSION:  1. Appendicitis with perforation. There is a 7.6 x 4.7 x 3.1 cm  abscess in the right lower quadrant.  2. Otherwise unremarkable CT abdomen pelvis.    Assessment:   Acute appendicitis with perforation  Peritonitis  Intrabdominal abscess without gangrene  Influenza A - recently diagnosed.     Plan:   Currently on Zosyn  Need to retest flu here  If positive, needs to be on droplet precautions.   Nurse informed to place droplet precautions until the test comes back.   No need for O2 support as he is ok on room air at the present.     All communication directed to consulting  provider.   Thank you very much for this consultation.

## 2024-02-08 NOTE — Clinical Note
9 F 25 cm Resolve locking drainage catheter inserted using CT guidance.  15 cm at skin.   Red purulent drainage collected and sent to lab.  Patient tolerated fairly well.

## 2024-02-08 NOTE — PROGRESS NOTES
Pharmacy Medication History Review    Tacho Conway is a 63 y.o. male admitted for Acute appendicitis with perforation, generalized peritonitis, and abscess, without gangrene. Pharmacy reviewed the patient's [id number] medications and allergies for accuracy.    The list below reflectives the updated PTA list. Please review each medication in order reconciliation for additional clarification and justification.  Prior to Admission medications    Medication Sig Start Date End Date Taking? Authorizing Provider   azithromycin (Zithromax) 250 mg tablet Take 1 tablet (250 mg) by mouth once daily. Take 2 tabs by mouth on day 1 and then 1 tab by mouth once a day for 4 days 2/5/24 2/9/24  Historical Provider, MD   ibuprofen 200 mg tablet Take 4 tablets (800 mg) by mouth every 6 hours if needed for mild pain (1 - 3).    Historical Provider, MD   melatonin 5 mg tablet Take 1 tablet (5 mg) by mouth as needed at bedtime for sleep.    Historical Provider, MD   polyethylene glycol (Miralax) 17 gram packet Take 17 g by mouth once daily as needed.    Historical Provider, MD        The list below reflectives the updated allergy list. Please review each documented allergy for additional clarification and justification.  Allergies  Reviewed by Mohan Craven CPhT on 2/8/2024   No Known Allergies         Below are additional concerns with the patient's PTA list.  Spoke with wife via phone (she answered pt phone). She went over medications. Pt is not on any maintenance medications.     Mohan Craven CPhT

## 2024-02-08 NOTE — Clinical Note
Dry sterile dressing being applied via Georgiana Lama Rad Tech.  Patient tolerated well. Report will be given to RN caring for patient.

## 2024-02-08 NOTE — Clinical Note
Patient on CT procedure table.   images being taken and reviewed by Dr. Arora.  Patient connected to monitor.

## 2024-02-09 ENCOUNTER — APPOINTMENT (OUTPATIENT)
Dept: RADIOLOGY | Facility: HOSPITAL | Age: 64
DRG: 373 | End: 2024-02-09
Payer: COMMERCIAL

## 2024-02-09 LAB
ANION GAP SERPL CALC-SCNC: 10 MMOL/L (ref 10–20)
BUN SERPL-MCNC: 8 MG/DL (ref 6–23)
CALCIUM SERPL-MCNC: 8.4 MG/DL (ref 8.6–10.3)
CHLORIDE SERPL-SCNC: 102 MMOL/L (ref 98–107)
CO2 SERPL-SCNC: 28 MMOL/L (ref 21–32)
CREAT SERPL-MCNC: 0.9 MG/DL (ref 0.5–1.3)
EGFRCR SERPLBLD CKD-EPI 2021: >90 ML/MIN/1.73M*2
ERYTHROCYTE [DISTWIDTH] IN BLOOD BY AUTOMATED COUNT: 12.2 % (ref 11.5–14.5)
GLUCOSE BLD MANUAL STRIP-MCNC: 96 MG/DL (ref 74–99)
GLUCOSE SERPL-MCNC: 100 MG/DL (ref 74–99)
HCT VFR BLD AUTO: 36.6 % (ref 41–52)
HGB BLD-MCNC: 12.8 G/DL (ref 13.5–17.5)
HOLD SPECIMEN: NORMAL
MCH RBC QN AUTO: 29.8 PG (ref 26–34)
MCHC RBC AUTO-ENTMCNC: 35 G/DL (ref 32–36)
MCV RBC AUTO: 85 FL (ref 80–100)
NRBC BLD-RTO: 0 /100 WBCS (ref 0–0)
PLATELET # BLD AUTO: 243 X10*3/UL (ref 150–450)
POTASSIUM SERPL-SCNC: 3.6 MMOL/L (ref 3.5–5.3)
RBC # BLD AUTO: 4.3 X10*6/UL (ref 4.5–5.9)
SODIUM SERPL-SCNC: 136 MMOL/L (ref 136–145)
WBC # BLD AUTO: 10.8 X10*3/UL (ref 4.4–11.3)

## 2024-02-09 PROCEDURE — 85027 COMPLETE CBC AUTOMATED: CPT

## 2024-02-09 PROCEDURE — C1769 GUIDE WIRE: HCPCS

## 2024-02-09 PROCEDURE — 74177 CT ABD & PELVIS W/CONTRAST: CPT | Performed by: RADIOLOGY

## 2024-02-09 PROCEDURE — 82947 ASSAY GLUCOSE BLOOD QUANT: CPT

## 2024-02-09 PROCEDURE — 2500000005 HC RX 250 GENERAL PHARMACY W/O HCPCS: Performed by: RADIOLOGY

## 2024-02-09 PROCEDURE — 1210000001 HC SEMI-PRIVATE ROOM DAILY

## 2024-02-09 PROCEDURE — 74177 CT ABD & PELVIS W/CONTRAST: CPT

## 2024-02-09 PROCEDURE — 87075 CULTR BACTERIA EXCEPT BLOOD: CPT | Mod: ELYLAB

## 2024-02-09 PROCEDURE — 75989 ABSCESS DRAINAGE UNDER X-RAY: CPT

## 2024-02-09 PROCEDURE — 0W9G30Z DRAINAGE OF PERITONEAL CAVITY WITH DRAINAGE DEVICE, PERCUTANEOUS APPROACH: ICD-10-PCS | Performed by: RADIOLOGY

## 2024-02-09 PROCEDURE — 49406 IMAGE CATH FLUID PERI/RETRO: CPT | Performed by: RADIOLOGY

## 2024-02-09 PROCEDURE — C1729 CATH, DRAINAGE: HCPCS

## 2024-02-09 PROCEDURE — 36415 COLL VENOUS BLD VENIPUNCTURE: CPT

## 2024-02-09 PROCEDURE — 99232 SBSQ HOSP IP/OBS MODERATE 35: CPT | Performed by: SURGERY

## 2024-02-09 PROCEDURE — 2550000001 HC RX 255 CONTRASTS: Performed by: SURGERY

## 2024-02-09 PROCEDURE — 80048 BASIC METABOLIC PNL TOTAL CA: CPT

## 2024-02-09 PROCEDURE — 2720000007 HC OR 272 NO HCPCS

## 2024-02-09 PROCEDURE — 2500000004 HC RX 250 GENERAL PHARMACY W/ HCPCS (ALT 636 FOR OP/ED)

## 2024-02-09 RX ORDER — LIDOCAINE HYDROCHLORIDE 20 MG/ML
INJECTION, SOLUTION EPIDURAL; INFILTRATION; INTRACAUDAL; PERINEURAL
Status: COMPLETED | OUTPATIENT
Start: 2024-02-09 | End: 2024-02-09

## 2024-02-09 RX ADMIN — LIDOCAINE HYDROCHLORIDE 9 ML: 20 INJECTION, SOLUTION EPIDURAL; INFILTRATION; INTRACAUDAL; PERINEURAL at 11:57

## 2024-02-09 RX ADMIN — PIPERACILLIN SODIUM AND TAZOBACTAM SODIUM 3.38 G: 3; .375 INJECTION, SOLUTION INTRAVENOUS at 14:49

## 2024-02-09 RX ADMIN — ACETAMINOPHEN 650 MG: 325 TABLET ORAL at 14:51

## 2024-02-09 RX ADMIN — SODIUM CHLORIDE, POTASSIUM CHLORIDE, SODIUM LACTATE AND CALCIUM CHLORIDE 100 ML/HR: 600; 310; 30; 20 INJECTION, SOLUTION INTRAVENOUS at 12:38

## 2024-02-09 RX ADMIN — ACETAMINOPHEN 650 MG: 325 TABLET ORAL at 05:34

## 2024-02-09 RX ADMIN — PIPERACILLIN SODIUM AND TAZOBACTAM SODIUM 3.38 G: 3; .375 INJECTION, SOLUTION INTRAVENOUS at 05:27

## 2024-02-09 RX ADMIN — MORPHINE SULFATE 2 MG: 2 INJECTION, SOLUTION INTRAMUSCULAR; INTRAVENOUS at 20:28

## 2024-02-09 RX ADMIN — PIPERACILLIN SODIUM AND TAZOBACTAM SODIUM 3.38 G: 3; .375 INJECTION, SOLUTION INTRAVENOUS at 22:48

## 2024-02-09 RX ADMIN — MORPHINE SULFATE 2 MG: 2 INJECTION, SOLUTION INTRAMUSCULAR; INTRAVENOUS at 15:42

## 2024-02-09 RX ADMIN — IOHEXOL 75 ML: 350 INJECTION, SOLUTION INTRAVENOUS at 10:15

## 2024-02-09 RX ADMIN — SODIUM CHLORIDE 10 ML: 9 INJECTION, SOLUTION INTRAVENOUS at 05:27

## 2024-02-09 RX ADMIN — SODIUM CHLORIDE 10 ML: 9 INJECTION, SOLUTION INTRAVENOUS at 14:51

## 2024-02-09 ASSESSMENT — PAIN SCALES - GENERAL
PAINLEVEL_OUTOF10: 0 - NO PAIN
PAINLEVEL_OUTOF10: 6
PAINLEVEL_OUTOF10: 0 - NO PAIN
PAINLEVEL_OUTOF10: 3
PAINLEVEL_OUTOF10: 2

## 2024-02-09 ASSESSMENT — COGNITIVE AND FUNCTIONAL STATUS - GENERAL
MOBILITY SCORE: 24
DAILY ACTIVITIY SCORE: 24

## 2024-02-09 ASSESSMENT — PAIN - FUNCTIONAL ASSESSMENT
PAIN_FUNCTIONAL_ASSESSMENT: 0-10

## 2024-02-09 NOTE — PROGRESS NOTES
"Tacho Conway is a 63 y.o. male on day 1 of admission presenting with Acute appendicitis with perforation, generalized peritonitis, and abscess, without gangrene.    Subjective   Drain placed today and 5 ml purulent fluid aspirated during drain placement, patient positive for influenza, pain is better       Objective     Physical Exam  Constitutional: no acute distress, well appearing and well nourished  Pulmonary: normal respiratory effort; clear to auscultation bilaterally, no wheezes or bronchi   Cardiovascular: regular rate and rhythm, no murmurs or extra-heart sounds; pedal pulses are normal; no extremities edema or varicosities, no peripheral edema  Abdomen: soft, minimal tenderness; drain with purulent outcome  Musculoskeletal: digits and nails normal without clubbing or cyanosis; Joints, bones and muscles are normal with normal range of motion; muscle strength/tone is normal  Skin: normal without rashes or lesion  Last Recorded Vitals  Blood pressure 121/56, pulse 64, temperature 37.5 °C (99.5 °F), resp. rate 24, height 1.803 m (5' 11\"), weight 80.3 kg (177 lb), SpO2 100 %.  Intake/Output last 3 Shifts:  I/O last 3 completed shifts:  In: 3065.3 (38.2 mL/kg) [P.O.:1440; I.V.:451.7 (5.6 mL/kg); IV Piggyback:1173.7]  Out: - (0 mL/kg)   Weight: 80.3 kg     Relevant Results            CT guided imaging for abscess drain    Result Date: 2/9/2024  Interpreted By:  Cristian Arora, STUDY: CT GUIDED IMAGING FOR ABSCESS DRAIN;  2/9/2024 12:09 pm   INDICATION: Signs/Symptoms:Perforated appendicitis with abscess, needs drain.   COMPARISON: CT abdomen/pelvis of 02/09/2024 and 02/08/2024.   ACCESSION NUMBER(S): [id number]   ORDERING CLINICIAN: JEFF SAMAYOA   TECHNIQUE: Having explained the risks, benefits, and alternatives to the planned CT-guided abscess drainage, informed consent was obtained and placed on the chart. A time-out was performed to confirm patient identity and the appropriate procedure.   Patient was " placed supine on the CT table. Initial unenhanced CT images were obtained through the lower abdomen for procedure planning. The right lower quadrant abscess was targeted from a right anterolateral approach and skin site marked. Skin was prepped and draped in the usual sterile fashion. Local anesthesia was achieved with 2% lidocaine. Using CT guidance, a 19 gauge introducer needle was advanced into the abscess with return of blood-tinged purulent fluid. Using Seldinger technique with serial dilation a 10.5 Samoan all-purpose pigtail drainage catheter was advanced into the abscess cavity. Catheter was secured with a Stay Fix bandage and attached to a gravity drainage bag. Approximate 5 mL aspirate of fluid was placed in sterile container for potential laboratory analysis. Patient tolerated the procedure well.   FINDINGS: Initial planning CT images again show a right lower quadrant gas containing fluid collection in proximity to the enlarged thickened poorly defined appendix. Procedural and post procedural images show good position of the pigtail drainage catheter coiled within the abscess cavity.       Successful CT-guided right lower quadrant perforated appendicitis related abscess drainage.   MACRO: None.   Signed by: Cristian Arora 2/9/2024 12:20 PM Dictation workstation:   [id number]    CT abdomen pelvis w IV contrast    Result Date: 2/9/2024  Interpreted By:  Cristian Arora, STUDY: CT ABDOMEN PELVIS W IV CONTRAST;  2/9/2024 10:14 am   INDICATION: Signs/Symptoms:Perforated appendicitis with abscess, further evaluation of the abscess.   COMPARISON: 02/08/2024.   ACCESSION NUMBER(S): [id number]   ORDERING CLINICIAN: JEFF SAMAYOA   TECHNIQUE: Contiguous axial images were obtained through the abdomen and pelvis after the administration of  75 mL Omnipaque 350 intravenous contrast. Coronal and sagittal reformations were made.   FINDINGS: LOWER CHEST: Bibasilar mild predominantly dependent atelectasis is  present.   ABDOMEN:   LIVER: Small low-attenuation region of the left hepatic lobe anteriorly near the falciform ligament likely is due to focal fatty infiltration or transient perfusion variation.   BILE DUCTS: Nondilated.   GALLBLADDER: The gallbladder is not distended and without calcified stones.   PANCREAS: Within normal limits.   SPLEEN: Within normal limits.   ADRENAL GLANDS: Within normal limits.   KIDNEYS AND URETERS: The kidneys enhance symmetrically without focal lesion. There is some early contrast excretion in both renal collecting systems. No hydroureteronephrosis bilaterally.   Prostate is enlarged protruding into the base of the urinary bladder. Small dystrophic calcification present in the prostate.   VESSELS: Few small scattered atherosclerotic calcifications are seen in the aorta and branch vessels. No aortic aneurysm. IVC is unremarkable.   BOWEL/PERITONEUM: Changes of perforated appendicitis with associated abscess are more clearly defined with contrast. The right lower quadrant gas containing fluid collection shows peripheral enhancement measuring approximately 5.8 x 3.5 x 6.5 cm in transverse, AP, and CC dimensions respectively. The base of the appendix appears mildly enlarged and thickened extending into this collection. The mid-distal aspect of the appendix extends into the collection and is poorly defined due to rupture. There is inflammatory fat stranding surrounding the abscess along with trace fluid in the right lower quadrant and mild wall thickening of the cecum.   No bowel obstruction. Mild mesenteric edema is present. No additional distant free intraperitoneal air.   Scattered small distal colonic diverticula are present without acute diverticulitis.   RETROPERITONEUM/LYMPH NODES: No retroperitoneal fluid collection or lymphadenopathy.   ABDOMINAL WALL: Tiny fat containing periumbilical hernia is present without inflammation.   BONE AND SOFT TISSUE: Mild multilevel disc space  narrowing and endplate spurring of the visualized spine is greatest at L5-S1.       Findings compatible with ruptured appendicitis and abscess as detailed.   No bowel obstruction.   MACRO: Cristian Arora discussed the significance and urgency of this critical finding via secure chat with  Chiki Samuel on 2/9/2024 at 10:51 am.  (**-RCF-**) Findings:  See findings.   Signed by: Cristian Arora 2/9/2024 10:52 AM Dictation workstation:   [id number]    CT abdomen pelvis wo IV contrast    Result Date: 2/8/2024  Interpreted By:  Harper Witt, STUDY: CT ABDOMEN PELVIS WO IV CONTRAST;  2/8/2024 8:28 am   INDICATION: Signs/Symptoms:RLQ pain; hx of R inguinal hernia repair 2y ago, R/O appy.   COMPARISON: None.   ACCESSION NUMBER(S): [id number]   ORDERING CLINICIAN: ABISAI MATAMOROS   TECHNIQUE: CT of the abdomen and pelvis was performed. No oral, no intravenous contrast.   FINDINGS: LOWER CHEST: Images of the lung bases show no infiltrate or pleural fluid. There is mild bibasilar scarring   ABDOMEN:   LIVER: There is no hepatic mass.   BILE DUCTS: There is no intrahepatic, common hepatic or common bile ductal dilatation.   GALLBLADDER: The gallbladder is unremarkable.   PANCREAS: The pancreas is unremarkable.   SPLEEN: The spleen is unremarkable. There is no splenic mass or splenomegaly.   ADRENAL GLANDS: The adrenal glands are unremarkable.   KIDNEYS AND URETERS: The kidneys demonstrate no mass.  There is no intrarenal calculus or hydronephrosis.   BOWEL: There is marked haziness of the fat in the right lower quadrant. A normal appendix is not seen. There is a 7.6 x 4.7 x 3.1 cm fluid collection in the right lower quadrant containing dots of air. There is hyperdense material perhaps fecaliths from the appendiceal lumen. Findings are consistent with perforated appendicitis.   VESSELS: There is atherosclerotic calcification of the abdominal aorta.   PERITONEUM/RETROPERITONEUM/LYMPH NODES: There is no retroperitoneal  or pelvic adenopathy.  There is no ascites.   ABDOMINAL WALL: The abdominal wall is unremarkable.   BONE AND SOFT TISSUE: There is no acute osseous finding.   There is no soft tissue abnormality.       1. Appendicitis with perforation. There is a 7.6 x 4.7 x 3.1 cm abscess in the right lower quadrant. 2. Otherwise unremarkable CT abdomen pelvis.   MACRO: Harper Witt discussed the significance and urgency of this critical finding by secure chat with  ABISAI MATAMOROS on 2/8/2024 at 8:55 am.  (**-RCF-**) Findings:  See findings.   Signed by: Harper Witt 2/8/2024 8:55 AM Dictation workstation:   [id number]                   Assessment/Plan   Principal Problem:    Acute appendicitis with perforation, generalized peritonitis, and abscess, without gangrene    Perforated acute appendicitis with an abscess.  Status post drain placement.  Continue Zosyn.  Continue clear liquid diet.  Appreciate infectious disease input.        Chiki Samuel MD

## 2024-02-09 NOTE — H&P
"History Of Present Illness  63-year-old male presented to the ER with abdominal pain, fevers, chills and generalized fatigue. He was diagnosed with influenza A and treated with Zithromax over 5 days ago. He started having sharp pain in the RLQ that became worse on Monday. Per him and wife, the pain was sharp, 10  out of 10 in RLQ with no relieving factor or radiation. He continues to have chills, fevers, decrease appetite and fatigue. Initially, he thought his right inguinal hernia which was repaired years ago had recurred. \"After my daughter jumped on my belly and the pain was intense, I knew something bad was going on\". Wbc: 16.6.  CT abdomen/pelvis without contrast showed appendicitis with perforation and abscess (8x5x3 cm) in the right lower quadrant.     Past Medical History  Bell's palsy, Influenza A    Surgical History  Open right inguinal hernia repair, microlaminectomy     Social History  He reports that he has never smoked. He has never used smokeless tobacco. He reports that he does not currently use alcohol. He reports that he does not use drugs. He is a     Family History  Reviewed and non-contributory     Allergies  Patient has no known allergies.         Physical Exam  Constitutional: no acute distress, well appearing and well nourished  Eyes: conjunctiva and lids with no erythema, swelling or discharge; EOMI  Ears, Nose, Mouth and Throat: external inspection of ears and nose are normal; oropharynx normal with no erythema, edema, exudate or lesions  Neck: supple, no mass or adenopathy, full range of motion; thyroid not enlarged and no palpable nodules  Pulmonary: normal respiratory effort; clear to auscultation bilaterally, no wheezes or bronchi   Cardiovascular: regular rate and rhythm, no murmurs or extra-heart sounds; pedal pulses are normal; no extremities edema or varicosities, no peripheral edema  Abdomen: soft, moderate tenderness in RLQ, non-distended; no organomegaly; no " "peritoneal sign, no hernia  Musculoskeletal: digits and nails normal without clubbing or cyanosis; Joints, bones and muscles are normal with normal range of motion; muscle strength/tone is normal  Skin: normal without rashes or lesion  Neurologic: cranial nerve II-XII intact grossly; normal gait  Psychiatric: oriented to person, place and time  Last Recorded Vitals  Blood pressure 111/63, pulse 72, temperature 36.6 °C (97.9 °F), temperature source Oral, resp. rate 18, height 1.803 m (5' 11\"), weight 80.3 kg (177 lb), SpO2 95 %.    Relevant Results        Results for orders placed or performed during the hospital encounter of 02/08/24 (from the past 24 hour(s))   CBC and Auto Differential   Result Value Ref Range    WBC 16.6 (H) 4.4 - 11.3 x10*3/uL    nRBC 0.0 0.0 - 0.0 /100 WBCs    RBC 5.03 4.50 - 5.90 x10*6/uL    Hemoglobin 15.1 13.5 - 17.5 g/dL    Hematocrit 42.4 41.0 - 52.0 %    MCV 84 80 - 100 fL    MCH 30.0 26.0 - 34.0 pg    MCHC 35.6 32.0 - 36.0 g/dL    RDW 12.2 11.5 - 14.5 %    Platelets 232 150 - 450 x10*3/uL    Neutrophils % 86.1 40.0 - 80.0 %    Immature Granulocytes %, Automated 0.5 0.0 - 0.9 %    Lymphocytes % 5.9 13.0 - 44.0 %    Monocytes % 7.2 2.0 - 10.0 %    Eosinophils % 0.1 0.0 - 6.0 %    Basophils % 0.2 0.0 - 2.0 %    Neutrophils Absolute 14.32 (H) 1.20 - 7.70 x10*3/uL    Immature Granulocytes Absolute, Automated 0.09 0.00 - 0.70 x10*3/uL    Lymphocytes Absolute 0.98 (L) 1.20 - 4.80 x10*3/uL    Monocytes Absolute 1.20 (H) 0.10 - 1.00 x10*3/uL    Eosinophils Absolute 0.01 0.00 - 0.70 x10*3/uL    Basophils Absolute 0.04 0.00 - 0.10 x10*3/uL   Comprehensive Metabolic Panel   Result Value Ref Range    Glucose 104 (H) 74 - 99 mg/dL    Sodium 129 (L) 136 - 145 mmol/L    Potassium 3.6 3.5 - 5.3 mmol/L    Chloride 94 (L) 98 - 107 mmol/L    Bicarbonate 26 21 - 32 mmol/L    Anion Gap 13 10 - 20 mmol/L    Urea Nitrogen 14 6 - 23 mg/dL    Creatinine 0.94 0.50 - 1.30 mg/dL    eGFR >90 >60 mL/min/1.73m*2    " Calcium 9.0 8.6 - 10.3 mg/dL    Albumin 3.8 3.4 - 5.0 g/dL    Alkaline Phosphatase 85 33 - 136 U/L    Total Protein 7.2 6.4 - 8.2 g/dL    AST 27 9 - 39 U/L    Bilirubin, Total 1.2 0.0 - 1.2 mg/dL    ALT 43 10 - 52 U/L   Lipase   Result Value Ref Range    Lipase 34 9 - 82 U/L   Urinalysis with Reflex Culture and Microscopic   Result Value Ref Range    Color, Urine Yellow Straw, Yellow    Appearance, Urine Clear Clear    Specific Gravity, Urine 1.012 1.005 - 1.035    pH, Urine 6.0 5.0, 5.5, 6.0, 6.5, 7.0, 7.5, 8.0    Protein, Urine 30 (1+) (N) NEGATIVE mg/dL    Glucose, Urine NEGATIVE NEGATIVE mg/dL    Blood, Urine MODERATE (2+) (A) NEGATIVE    Ketones, Urine 20 (1+) (A) NEGATIVE mg/dL    Bilirubin, Urine NEGATIVE NEGATIVE    Urobilinogen, Urine <2.0 <2.0 mg/dL    Nitrite, Urine NEGATIVE NEGATIVE    Leukocyte Esterase, Urine NEGATIVE NEGATIVE   Urinalysis Microscopic   Result Value Ref Range    WBC, Urine 1-5 1-5, NONE /HPF    RBC, Urine 11-20 (A) NONE, 1-2, 3-5 /HPF    Mucus, Urine 1+ Reference range not established. /LPF   Influenza A, and B PCR   Result Value Ref Range    Flu A Result Detected (A) Not Detected    Flu B Result Not Detected Not Detected      CT abdomen pelvis wo IV contrast    Result Date: 2/8/2024  Interpreted By:  Harper Witt, STUDY: CT ABDOMEN PELVIS WO IV CONTRAST;  2/8/2024 8:28 am   INDICATION: Signs/Symptoms:RLQ pain; hx of R inguinal hernia repair 2y ago, R/O appy.   COMPARISON: None.   ACCESSION NUMBER(S): [id number]   ORDERING CLINICIAN: ABISAI MATAMOROS   TECHNIQUE: CT of the abdomen and pelvis was performed. No oral, no intravenous contrast.   FINDINGS: LOWER CHEST: Images of the lung bases show no infiltrate or pleural fluid. There is mild bibasilar scarring   ABDOMEN:   LIVER: There is no hepatic mass.   BILE DUCTS: There is no intrahepatic, common hepatic or common bile ductal dilatation.   GALLBLADDER: The gallbladder is unremarkable.   PANCREAS: The pancreas is unremarkable.    SPLEEN: The spleen is unremarkable. There is no splenic mass or splenomegaly.   ADRENAL GLANDS: The adrenal glands are unremarkable.   KIDNEYS AND URETERS: The kidneys demonstrate no mass.  There is no intrarenal calculus or hydronephrosis.   BOWEL: There is marked haziness of the fat in the right lower quadrant. A normal appendix is not seen. There is a 7.6 x 4.7 x 3.1 cm fluid collection in the right lower quadrant containing dots of air. There is hyperdense material perhaps fecaliths from the appendiceal lumen. Findings are consistent with perforated appendicitis.   VESSELS: There is atherosclerotic calcification of the abdominal aorta.   PERITONEUM/RETROPERITONEUM/LYMPH NODES: There is no retroperitoneal or pelvic adenopathy.  There is no ascites.   ABDOMINAL WALL: The abdominal wall is unremarkable.   BONE AND SOFT TISSUE: There is no acute osseous finding.   There is no soft tissue abnormality.       1. Appendicitis with perforation. There is a 7.6 x 4.7 x 3.1 cm abscess in the right lower quadrant. 2. Otherwise unremarkable CT abdomen pelvis.   MACRO: Harper Witt discussed the significance and urgency of this critical finding by secure chat with  ABISAI MATAMOROS on 2/8/2024 at 8:55 am.  (**-RCF-**) Findings:  See findings.   Signed by: Harper Witt 2/8/2024 8:55 AM Dictation workstation:   [id number]       Assessment/Plan   Principal Problem:    Acute appendicitis with perforation, generalized peritonitis, and abscess, without gangrene    63-year-old patient with perforated acute appendicitis with abscess.  I have reviewed the labs, CT abdomen and pelvis and also spoke with the interventional radiologist.  CT scan is limited due to the lack of IV contrast as the abscess is not well defined.  Will repeat CT abdomen/pelvis with IV contrast in a.m. and based on findings will consult radiology for drain placement.  This was discussed with the patient and his wife.  Will continue IV Zosyn.  We talked about  indications for surgery which is sepsis or inability for the abscess to be approached percutaneously           Chiki Samuel MD

## 2024-02-09 NOTE — PROGRESS NOTES
02/09/24 1510   Discharge Planning   Living Arrangements Spouse/significant other   Support Systems Spouse/significant other   Type of Residence Private residence   Who is requesting discharge planning? Provider   Patient expects to be discharged to: Home poss C   Does the patient need discharge transport arranged? No     Met with pt & his wife Marlyn at bedside. Admitted for acute appendicitis with perforation. CT guided RLQ abscess drainage by IR this am. IV Zosyn. + Influenza. PCP Dr Sergio Reyes. Pt works from home. Plan for discharge home. Agreeable to [facility] if needed for drain care , wound care or IV ABX. Care transitions team to follow for discharge needs.

## 2024-02-09 NOTE — CARE PLAN
The patient's goals for the shift include  : less abd pain     The clinical goals for the shift include patient will remain safe throughout shift    Over the shift, the patient did not make progress toward the following goals. Barriers to progression include . Recommendations to address these barriers include .

## 2024-02-09 NOTE — POST-PROCEDURE NOTE
Interventional Radiology Brief Postprocedure Note    Attending: Cristian Arora MD    Diagnosis: Perforated appendicitis with abscess    Description of procedure: CT guide RLQ abscess drainage     Anesthesia:  Local    Complications: None    Estimated Blood Loss: none    Medications  As of 02/09/24 1216      ondansetron (Zofran) injection 4 mg (mg) Total dose:  0 mg* Dosing weight:  80.3   *Administration not included in total     Date/Time Rate/Dose/Volume Action       02/08/24  0800 *4 mg Missed               sodium chloride 0.9 % bolus 1,000 mL (mL/hr) Total volume:  1,000 mL* Dosing weight:  80.3   *From user-documented volume     Date/Time Rate/Dose/Volume Action       02/08/24  0807 1,000 mL - 2,000 mL/hr (over 30 min) New Bag      0837 1,000 mL Stopped               sodium chloride 0.9 % bolus 10 mL (mL/hr) Total volume:  23.67 mL* Dosing weight:  80.3   *From user-documented volume     Date/Time Rate/Dose/Volume Action       02/08/24  1348 10 mL - 10 mL/hr (over 60 min) New Bag      1448 10 mL Stopped      Canceled Entry      2119 10 mL - 10 mL/hr (over 60 min) New Bag      2219  (over 60 min) Stopped     02/09/24  0527 10 mL - 10 mL/hr (over 60 min) New Bag      0627  (over 60 min) Stopped               piperacillin-tazobactam (Zosyn) in sodium chloride 0.9 % 100 mL IV 4.5 g (mL/hr) Total dose:  4.5 g* Dosing weight:  80.3   *From user-documented volume     Date/Time Rate/Dose/Volume Action       02/08/24  0906 4.5 g - 200 mL/hr (over 30 min) New Bag      0936 100 mL Stopped               lactated Ringer's infusion (mL/hr) Total volume:  2,178.33 mL* Dosing weight:  80.3   *From user-documented volume     Date/Time Rate/Dose/Volume Action       02/08/24  1329 100 mL/hr New Bag      1510 100 mL/hr - 168.33 mL Rate Verify      1800 283.33 mL       2344 100 mL/hr New Bag     02/09/24  1116 1,726.67 mL Stopped               acetaminophen (Tylenol) tablet 650 mg (mg) Total dose:  1,950 mg* Dosing weight:  80.3    *Administration not included in total     Date/Time Rate/Dose/Volume Action       02/08/24  1356 *650 mg Missed      1357 650 mg Given      2118 650 mg Given     02/09/24  0534 650 mg Given               acetaminophen (Tylenol) oral liquid 650 mg (mg) Total dose:  Cannot be calculated* Dosing weight:  80.3   *Administration dose not documented     Date/Time Rate/Dose/Volume Action       02/08/24  1356 *Not included in total See Alternative      2118 *Not included in total See Alternative     02/09/24  0534 *Not included in total See Alternative               acetaminophen (Tylenol) oral liquid 650 mg (mg) Total dose:  Cannot be calculated* Dosing weight:  80.3   *Administration dose not documented     Date/Time Rate/Dose/Volume Action       02/08/24  1357 *Not included in total See Alternative               acetaminophen (Tylenol) suppository 650 mg (mg) Total dose:  Cannot be calculated* Dosing weight:  80.3   *Administration dose not documented     Date/Time Rate/Dose/Volume Action       02/08/24  1356 *Not included in total See Alternative      2118 *Not included in total See Alternative     02/09/24  0534 *Not included in total See Alternative               acetaminophen (Tylenol) suppository 650 mg (mg) Total dose:  Cannot be calculated* Dosing weight:  80.3   *Administration dose not documented     Date/Time Rate/Dose/Volume Action       02/08/24  1357 *Not included in total See Alternative               piperacillin-tazobactam-dextrose (Zosyn) IV 3.375 g (mL/hr) Total dose:  10.13 g* Dosing weight:  80.3   *From user-documented volume     Date/Time Rate/Dose/Volume Action       02/08/24  1348 3.375 g - 12.5 mL/hr (over 240 min) New Bag      1748 50 mL Stopped      2116 3.375 g - 12.5 mL/hr (over 240 min) New Bag     02/09/24  0116  (over 240 min) Stopped      0527 3.375 g - 12.5 mL/hr (over 240 min) New Bag      0927 100 mL Stopped               iohexol (OMNIPaque) 350 mg iodine/mL solution 75 mL (mL) Total  volume:  75 mL Dosing weight:  80.3      Date/Time Rate/Dose/Volume Action       02/09/24  1015 75 mL Given               lidocaine PF (Xylocaine) 20 mg/mL (2 %) injection (mL) Total volume:  9 mL      Date/Time Rate/Dose/Volume Action       02/09/24  1157 9 mL Given                   Abscess aspirate placed in sterile container.      See detailed result report with images in PACS.    The patient tolerated the procedure well without incident or complication and is in stable condition.

## 2024-02-09 NOTE — CARE PLAN
Problem: Pain - Adult  Goal: Verbalizes/displays adequate comfort level or baseline comfort level  2/9/2024 1849 by Jeane Jacome RN  Outcome: Progressing  2/9/2024 1012 by Jeane Jacome RN  Flowsheets (Taken 2/8/2024 1204)  Verbalizes/displays adequate comfort level or baseline comfort level:   Encourage patient to monitor pain and request assistance   Assess pain using appropriate pain scale   Administer analgesics based on type and severity of pain and evaluate response   Implement non-pharmacological measures as appropriate and evaluate response   Consider cultural and social influences on pain and pain management   Notify Licensed Independent Practitioner if interventions unsuccessful or patient reports new pain     Problem: Safety - Adult  Goal: Free from fall injury  2/9/2024 1849 by Jeane Jacome RN  Outcome: Progressing  2/9/2024 1012 by Jeane Jacome RN  Flowsheets (Taken 2/8/2024 1204)  Free from fall injury: Instruct family/caregiver on patient safety     Problem: Discharge Planning  Goal: Discharge to home or other facility with appropriate resources  2/9/2024 1849 by Jeane Jacome RN  Outcome: Progressing  2/9/2024 1012 by Jeane Jacome RN  Flowsheets (Taken 2/8/2024 1204)  Discharge to home or other facility with appropriate resources:   Identify barriers to discharge with patient and caregiver   Arrange for needed discharge resources and transportation as appropriate   Identify discharge learning needs (meds, wound care, etc)   Refer to discharge planning if patient needs post-hospital services based on physician order or complex needs related to functional status, cognitive ability or social support system     Problem: Chronic Conditions and Co-morbidities  Goal: Patient's chronic conditions and co-morbidity symptoms are monitored and maintained or improved  2/9/2024 1849 by Jeane Jacome RN  Outcome: Progressing  2/9/2024 1012 by Jeane Jacome RN  Flowsheets (Taken  2/8/2024 1204)  Care Plan - Patient's Chronic Conditions and Co-Morbidity Symptoms are Monitored and Maintained or Improved:   Monitor and assess patient's chronic conditions and comorbid symptoms for stability, deterioration, or improvement   Collaborate with multidisciplinary team to address chronic and comorbid conditions and prevent exacerbation or deterioration   Update acute care plan with appropriate goals if chronic or comorbid symptoms are exacerbated and prevent overall improvement and discharge     Problem: Nutrition  Goal: Less than 5 days NPO/clear liquids  Outcome: Progressing  Goal: Oral intake greater 75%  Outcome: Progressing  Goal: Consume prescribed supplement  Outcome: Progressing  Goal: Adequate PO fluid intake  Outcome: Progressing  Goal: Lab values WNL  Outcome: Progressing  Goal: Electrolytes WNL  Outcome: Progressing  Goal: Maintain stable weight  Outcome: Progressing     Problem: Pain  Goal: Walks with improved pain control throughout the shift  Outcome: Progressing  Goal: Performs ADL's with improved pain control throughout shift  Outcome: Progressing  Goal: Participates in PT with improved pain control throughout the shift  Outcome: Progressing  Goal: Free from opioid side effects throughout the shift  Outcome: Progressing  Goal: Free from acute confusion related to pain meds throughout the shift  Outcome: Progressing   The patient's goals for the shift include      The clinical goals for the shift include Patient will have pain managed to a tolerable level throughout this shift.    Over the shift, the patient did make progress toward care plan goals.

## 2024-02-10 LAB
GLUCOSE BLD MANUAL STRIP-MCNC: 105 MG/DL (ref 74–99)
GLUCOSE BLD MANUAL STRIP-MCNC: 99 MG/DL (ref 74–99)

## 2024-02-10 PROCEDURE — 1210000001 HC SEMI-PRIVATE ROOM DAILY

## 2024-02-10 PROCEDURE — 2500000004 HC RX 250 GENERAL PHARMACY W/ HCPCS (ALT 636 FOR OP/ED)

## 2024-02-10 PROCEDURE — 99231 SBSQ HOSP IP/OBS SF/LOW 25: CPT | Performed by: SURGERY

## 2024-02-10 PROCEDURE — 99233 SBSQ HOSP IP/OBS HIGH 50: CPT | Performed by: REGISTERED NURSE

## 2024-02-10 PROCEDURE — 82947 ASSAY GLUCOSE BLOOD QUANT: CPT

## 2024-02-10 RX ADMIN — SODIUM CHLORIDE 10 ML: 9 INJECTION, SOLUTION INTRAVENOUS at 05:32

## 2024-02-10 RX ADMIN — SODIUM CHLORIDE, POTASSIUM CHLORIDE, SODIUM LACTATE AND CALCIUM CHLORIDE 100 ML/HR: 600; 310; 30; 20 INJECTION, SOLUTION INTRAVENOUS at 20:25

## 2024-02-10 RX ADMIN — SODIUM CHLORIDE 10 ML: 9 INJECTION, SOLUTION INTRAVENOUS at 22:06

## 2024-02-10 RX ADMIN — MORPHINE SULFATE 2 MG: 2 INJECTION, SOLUTION INTRAMUSCULAR; INTRAVENOUS at 05:34

## 2024-02-10 RX ADMIN — PIPERACILLIN SODIUM AND TAZOBACTAM SODIUM 3.38 G: 3; .375 INJECTION, SOLUTION INTRAVENOUS at 05:32

## 2024-02-10 RX ADMIN — MORPHINE SULFATE 2 MG: 2 INJECTION, SOLUTION INTRAMUSCULAR; INTRAVENOUS at 17:43

## 2024-02-10 RX ADMIN — SODIUM CHLORIDE, POTASSIUM CHLORIDE, SODIUM LACTATE AND CALCIUM CHLORIDE 100 ML/HR: 600; 310; 30; 20 INJECTION, SOLUTION INTRAVENOUS at 00:21

## 2024-02-10 RX ADMIN — PIPERACILLIN SODIUM AND TAZOBACTAM SODIUM 3.38 G: 3; .375 INJECTION, SOLUTION INTRAVENOUS at 22:06

## 2024-02-10 RX ADMIN — SODIUM CHLORIDE 10 ML: 9 INJECTION, SOLUTION INTRAVENOUS at 14:47

## 2024-02-10 RX ADMIN — ACETAMINOPHEN 650 MG: 325 TABLET ORAL at 05:32

## 2024-02-10 RX ADMIN — PIPERACILLIN SODIUM AND TAZOBACTAM SODIUM 3.38 G: 3; .375 INJECTION, SOLUTION INTRAVENOUS at 14:40

## 2024-02-10 RX ADMIN — MORPHINE SULFATE 2 MG: 2 INJECTION, SOLUTION INTRAMUSCULAR; INTRAVENOUS at 10:58

## 2024-02-10 ASSESSMENT — COGNITIVE AND FUNCTIONAL STATUS - GENERAL
DAILY ACTIVITIY SCORE: 24
MOBILITY SCORE: 24

## 2024-02-10 ASSESSMENT — PAIN - FUNCTIONAL ASSESSMENT
PAIN_FUNCTIONAL_ASSESSMENT: 0-10

## 2024-02-10 ASSESSMENT — PAIN DESCRIPTION - LOCATION
LOCATION: ABDOMEN
LOCATION: ABDOMEN

## 2024-02-10 ASSESSMENT — PAIN DESCRIPTION - ORIENTATION: ORIENTATION: RIGHT

## 2024-02-10 ASSESSMENT — PAIN SCALES - GENERAL
PAINLEVEL_OUTOF10: 5 - MODERATE PAIN
PAINLEVEL_OUTOF10: 2
PAINLEVEL_OUTOF10: 0 - NO PAIN
PAINLEVEL_OUTOF10: 0 - NO PAIN
PAINLEVEL_OUTOF10: 10 - WORST POSSIBLE PAIN
PAINLEVEL_OUTOF10: 4
PAINLEVEL_OUTOF10: 6

## 2024-02-10 NOTE — PROGRESS NOTES
General Surgery Progress Note    Patient: Tacho Conway  Unit/Bed: 1109/1109-B  YOB: 1960  MRN: [id number]  Acct: [id number]   Admitting Diagnosis: Acute appendicitis with perforation, generalized peritonitis, and abscess, without gangrene [K35.211]  Date:  2/8/2024  Hospital Day: 2  Attending: Chiki Samuel MD    Complaint:  Chief Complaint   Patient presents with    Abdominal Pain     +Flu Sunday, but not eating, no appetite       Subjective  Patient seen and examined this morning. No acute events overnight. Patient denies nausea, vomiting. Denies ABD pain. States last BM was this morning. Tolerating diet.     PHYSICAL EXAM:  Physical Exam  Vitals reviewed.   Constitutional:       Appearance: Normal appearance.   HENT:      Head: Normocephalic.      Nose: Nose normal.      Mouth/Throat:      Mouth: Mucous membranes are moist.   Cardiovascular:      Rate and Rhythm: Normal rate.   Pulmonary:      Effort: Pulmonary effort is normal.   Abdominal:      General: Abdomen is flat. Bowel sounds are normal.      Palpations: Abdomen is soft.      Comments: Drain minimal purulent drainage.    Skin:     General: Skin is warm.      Capillary Refill: Capillary refill takes less than 2 seconds.   Neurological:      General: No focal deficit present.      Mental Status: He is alert and oriented to person, place, and time.   Psychiatric:         Mood and Affect: Mood normal.       Vital signs in last 24 hours:  Vitals:    02/10/24 1037   BP: 110/66   Pulse: 67   Resp: 16   Temp: 36.6 °C (97.9 °F)   SpO2: 98%     Intake/Output this shift:    Intake/Output Summary (Last 24 hours) at 2/10/2024 1321  Last data filed at 2/10/2024 0932  Gross per 24 hour   Intake 2950 ml   Output 20 ml   Net 2930 ml      Allergies:  No Known Allergies   Medications:  Scheduled medications  ondansetron, 4 mg, intravenous, Once  piperacillin-tazobactam, 3.375 g, intravenous, q8h   And  sodium chloride, 10 mL, intravenous,  q8h      Continuous medications  lactated Ringer's, 100 mL/hr, Last Rate: 100 mL/hr (02/10/24 0021)      PRN medications  PRN medications: acetaminophen **OR** acetaminophen **OR** acetaminophen, acetaminophen **OR** acetaminophen **OR** acetaminophen, HYDROmorphone, morphine, morphine, ondansetron ODT **OR** ondansetron  Labs:  Results for orders placed or performed during the hospital encounter of 02/08/24 (from the past 24 hour(s))   POCT GLUCOSE   Result Value Ref Range    POCT Glucose 96 74 - 99 mg/dL   POCT GLUCOSE   Result Value Ref Range    POCT Glucose 99 74 - 99 mg/dL   POCT GLUCOSE   Result Value Ref Range    POCT Glucose 105 (H) 74 - 99 mg/dL      Imaging:  CT guided imaging for abscess drain    Result Date: 2/9/2024  Interpreted By:  Cristian Arora, STUDY: CT GUIDED IMAGING FOR ABSCESS DRAIN;  2/9/2024 12:09 pm   INDICATION: Signs/Symptoms:Perforated appendicitis with abscess, needs drain.   COMPARISON: CT abdomen/pelvis of 02/09/2024 and 02/08/2024.   ACCESSION NUMBER(S): [id number]   ORDERING CLINICIAN: JEFF SAMAYOA   TECHNIQUE: Having explained the risks, benefits, and alternatives to the planned CT-guided abscess drainage, informed consent was obtained and placed on the chart. A time-out was performed to confirm patient identity and the appropriate procedure.   Patient was placed supine on the CT table. Initial unenhanced CT images were obtained through the lower abdomen for procedure planning. The right lower quadrant abscess was targeted from a right anterolateral approach and skin site marked. Skin was prepped and draped in the usual sterile fashion. Local anesthesia was achieved with 2% lidocaine. Using CT guidance, a 19 gauge introducer needle was advanced into the abscess with return of blood-tinged purulent fluid. Using Seldinger technique with serial dilation a 10.5 [place] all-purpose pigtail drainage catheter was advanced into the abscess cavity. Catheter was secured with a Stay Fix  bandage and attached to a gravity drainage bag. Approximate 5 mL aspirate of fluid was placed in sterile container for potential laboratory analysis. Patient tolerated the procedure well.   FINDINGS: Initial planning CT images again show a right lower quadrant gas containing fluid collection in proximity to the enlarged thickened poorly defined appendix. Procedural and post procedural images show good position of the pigtail drainage catheter coiled within the abscess cavity.       Successful CT-guided right lower quadrant perforated appendicitis related abscess drainage.   MACRO: None.   Signed by: Cristian Arora 2/9/2024 12:20 PM Dictation workstation:   [id number]    CT abdomen pelvis w IV contrast    Result Date: 2/9/2024  Interpreted By:  Cristian Arora, STUDY: CT ABDOMEN PELVIS W IV CONTRAST;  2/9/2024 10:14 am   INDICATION: Signs/Symptoms:Perforated appendicitis with abscess, further evaluation of the abscess.   COMPARISON: 02/08/2024.   ACCESSION NUMBER(S): [id number]   ORDERING CLINICIAN: JEFF SAMAYOA   TECHNIQUE: Contiguous axial images were obtained through the abdomen and pelvis after the administration of  75 mL Omnipaque 350 intravenous contrast. Coronal and sagittal reformations were made.   FINDINGS: LOWER CHEST: Bibasilar mild predominantly dependent atelectasis is present.   ABDOMEN:   LIVER: Small low-attenuation region of the left hepatic lobe anteriorly near the falciform ligament likely is due to focal fatty infiltration or transient perfusion variation.   BILE DUCTS: Nondilated.   GALLBLADDER: The gallbladder is not distended and without calcified stones.   PANCREAS: Within normal limits.   SPLEEN: Within normal limits.   ADRENAL GLANDS: Within normal limits.   KIDNEYS AND URETERS: The kidneys enhance symmetrically without focal lesion. There is some early contrast excretion in both renal collecting systems. No hydroureteronephrosis bilaterally.   Prostate is enlarged protruding into  the base of the urinary bladder. Small dystrophic calcification present in the prostate.   VESSELS: Few small scattered atherosclerotic calcifications are seen in the aorta and branch vessels. No aortic aneurysm. IVC is unremarkable.   BOWEL/PERITONEUM: Changes of perforated appendicitis with associated abscess are more clearly defined with contrast. The right lower quadrant gas containing fluid collection shows peripheral enhancement measuring approximately 5.8 x 3.5 x 6.5 cm in transverse, AP, and CC dimensions respectively. The base of the appendix appears mildly enlarged and thickened extending into this collection. The mid-distal aspect of the appendix extends into the collection and is poorly defined due to rupture. There is inflammatory fat stranding surrounding the abscess along with trace fluid in the right lower quadrant and mild wall thickening of the cecum.   No bowel obstruction. Mild mesenteric edema is present. No additional distant free intraperitoneal air.   Scattered small distal colonic diverticula are present without acute diverticulitis.   RETROPERITONEUM/LYMPH NODES: No retroperitoneal fluid collection or lymphadenopathy.   ABDOMINAL WALL: Tiny fat containing periumbilical hernia is present without inflammation.   BONE AND SOFT TISSUE: Mild multilevel disc space narrowing and endplate spurring of the visualized spine is greatest at L5-S1.       Findings compatible with ruptured appendicitis and abscess as detailed.   No bowel obstruction.   MACRO: Cristian Arora discussed the significance and urgency of this critical finding via secure chat with  Chiki Samuel on 2/9/2024 at 10:51 am.  (**-RCF-**) Findings:  See findings.   Signed by: Cristian Arora 2/9/2024 10:52 AM Dictation workstation:   [id number]     Assessment  Perforated acute appendicitis with abscess, IR placed drain 2/9/24    On exam ABD is soft, non distended and non tender. No pain elicited with palpation. Bowel sounds  present x 4 quadrant. Minimal purulent drainage from drain. Afebrile      Plan  -Diet advanced to regular  -Pain control  -Nausea control  -Continue IV ABX until ID weighs in  -DVT prophylaxis-SCDs, ambulation   -Pulmonary toileting   -Encourage ambulation  -Awaiting ID recommendations       Further recommendations per Dr. Carter Burns, APRN-CNP    Time spent  37  minutes obtaining labs, imaging, recommendations, interview, assessment, examination, medication review/ordering, and EMR review.    Plan of care was discussed extensively with patient. Patient verbalized understanding through teach back method. All questions and concerns addressed upon examination.     Of note, this documentation is completed using the Dragon Dictation system (voice recognition software). There may be spelling and/or grammatical errors that were not corrected prior to final submission.     Agree with note above except where noted below    Patient underwent CT guided drainage yesterday, feeling better, no other complaints    On physical exam, patient is NAD, Aax3, abdomen soft, ND, minimally TTP in RLQ with drain in place with purulent drainage    64 y/o mael s/p IR drain of appendiceal abscess on 2/9/2024, doing well  -Regular diet  -Pain control  -IV abx, fu ID recs  -OOB, SCDs, ISS, DVT proph  -Cont. IP Care    Roger Machado MD  02/10/24  2:33 PM

## 2024-02-10 NOTE — CARE PLAN
The patient's goals for the shift include      The clinical goals for the shift include patient pain will be at a tolerable level through out shift    Over the shift, the patient did make progress toward the following goals. Barriers to progression include none. Recommendations to address these barriers include continue with current plan of care.

## 2024-02-11 ENCOUNTER — APPOINTMENT (OUTPATIENT)
Dept: RADIOLOGY | Facility: HOSPITAL | Age: 64
DRG: 373 | End: 2024-02-11
Payer: COMMERCIAL

## 2024-02-11 LAB
ALBUMIN SERPL BCP-MCNC: 3 G/DL (ref 3.4–5)
ALP SERPL-CCNC: 65 U/L (ref 33–136)
ALT SERPL W P-5'-P-CCNC: 23 U/L (ref 10–52)
ANION GAP SERPL CALC-SCNC: 11 MMOL/L (ref 10–20)
AST SERPL W P-5'-P-CCNC: 13 U/L (ref 9–39)
BILIRUB SERPL-MCNC: 0.6 MG/DL (ref 0–1.2)
BUN SERPL-MCNC: 6 MG/DL (ref 6–23)
CALCIUM SERPL-MCNC: 8.5 MG/DL (ref 8.6–10.3)
CHLORIDE SERPL-SCNC: 101 MMOL/L (ref 98–107)
CO2 SERPL-SCNC: 29 MMOL/L (ref 21–32)
CREAT SERPL-MCNC: 0.93 MG/DL (ref 0.5–1.3)
EGFRCR SERPLBLD CKD-EPI 2021: >90 ML/MIN/1.73M*2
ERYTHROCYTE [DISTWIDTH] IN BLOOD BY AUTOMATED COUNT: 12.7 % (ref 11.5–14.5)
GLUCOSE SERPL-MCNC: 116 MG/DL (ref 74–99)
HCT VFR BLD AUTO: 39.7 % (ref 41–52)
HGB BLD-MCNC: 13.4 G/DL (ref 13.5–17.5)
HOLD SPECIMEN: NORMAL
MAGNESIUM SERPL-MCNC: 1.97 MG/DL (ref 1.6–2.4)
MCH RBC QN AUTO: 29.3 PG (ref 26–34)
MCHC RBC AUTO-ENTMCNC: 33.8 G/DL (ref 32–36)
MCV RBC AUTO: 87 FL (ref 80–100)
NRBC BLD-RTO: 0 /100 WBCS (ref 0–0)
PLATELET # BLD AUTO: 310 X10*3/UL (ref 150–450)
POTASSIUM SERPL-SCNC: 3.8 MMOL/L (ref 3.5–5.3)
PROT SERPL-MCNC: 6.1 G/DL (ref 6.4–8.2)
RBC # BLD AUTO: 4.58 X10*6/UL (ref 4.5–5.9)
SODIUM SERPL-SCNC: 137 MMOL/L (ref 136–145)
WBC # BLD AUTO: 10.4 X10*3/UL (ref 4.4–11.3)

## 2024-02-11 PROCEDURE — 85027 COMPLETE CBC AUTOMATED: CPT | Performed by: REGISTERED NURSE

## 2024-02-11 PROCEDURE — 99231 SBSQ HOSP IP/OBS SF/LOW 25: CPT | Performed by: SURGERY

## 2024-02-11 PROCEDURE — 2500000001 HC RX 250 WO HCPCS SELF ADMINISTERED DRUGS (ALT 637 FOR MEDICARE OP): Performed by: REGISTERED NURSE

## 2024-02-11 PROCEDURE — 2500000004 HC RX 250 GENERAL PHARMACY W/ HCPCS (ALT 636 FOR OP/ED): Performed by: REGISTERED NURSE

## 2024-02-11 PROCEDURE — 1210000001 HC SEMI-PRIVATE ROOM DAILY

## 2024-02-11 PROCEDURE — 99232 SBSQ HOSP IP/OBS MODERATE 35: CPT | Performed by: REGISTERED NURSE

## 2024-02-11 PROCEDURE — 2500000004 HC RX 250 GENERAL PHARMACY W/ HCPCS (ALT 636 FOR OP/ED)

## 2024-02-11 PROCEDURE — 80053 COMPREHEN METABOLIC PANEL: CPT | Performed by: REGISTERED NURSE

## 2024-02-11 PROCEDURE — 36415 COLL VENOUS BLD VENIPUNCTURE: CPT | Performed by: REGISTERED NURSE

## 2024-02-11 PROCEDURE — 83735 ASSAY OF MAGNESIUM: CPT | Performed by: REGISTERED NURSE

## 2024-02-11 RX ORDER — OXYCODONE HYDROCHLORIDE 5 MG/1
10 TABLET ORAL EVERY 4 HOURS PRN
Status: DISCONTINUED | OUTPATIENT
Start: 2024-02-11 | End: 2024-02-13 | Stop reason: HOSPADM

## 2024-02-11 RX ORDER — OXYCODONE HYDROCHLORIDE 5 MG/1
5 TABLET ORAL EVERY 4 HOURS PRN
Status: DISCONTINUED | OUTPATIENT
Start: 2024-02-11 | End: 2024-02-13 | Stop reason: HOSPADM

## 2024-02-11 RX ORDER — ENOXAPARIN SODIUM 100 MG/ML
40 INJECTION SUBCUTANEOUS DAILY
Status: DISCONTINUED | OUTPATIENT
Start: 2024-02-11 | End: 2024-02-13 | Stop reason: HOSPADM

## 2024-02-11 RX ORDER — LIDOCAINE HYDROCHLORIDE 10 MG/ML
5 INJECTION INFILTRATION; PERINEURAL ONCE
Status: COMPLETED | OUTPATIENT
Start: 2024-02-11 | End: 2024-02-13

## 2024-02-11 RX ADMIN — SODIUM CHLORIDE, POTASSIUM CHLORIDE, SODIUM LACTATE AND CALCIUM CHLORIDE 100 ML/HR: 600; 310; 30; 20 INJECTION, SOLUTION INTRAVENOUS at 06:02

## 2024-02-11 RX ADMIN — PIPERACILLIN SODIUM AND TAZOBACTAM SODIUM 3.38 G: 3; .375 INJECTION, SOLUTION INTRAVENOUS at 06:01

## 2024-02-11 RX ADMIN — MORPHINE SULFATE 4 MG: 4 INJECTION, SOLUTION INTRAMUSCULAR; INTRAVENOUS at 00:21

## 2024-02-11 RX ADMIN — SODIUM CHLORIDE 10 ML: 9 INJECTION, SOLUTION INTRAVENOUS at 14:41

## 2024-02-11 RX ADMIN — PIPERACILLIN SODIUM AND TAZOBACTAM SODIUM 3.38 G: 3; .375 INJECTION, SOLUTION INTRAVENOUS at 14:41

## 2024-02-11 RX ADMIN — OXYCODONE HYDROCHLORIDE 5 MG: 5 TABLET ORAL at 18:09

## 2024-02-11 RX ADMIN — SODIUM CHLORIDE 10 ML: 9 INJECTION, SOLUTION INTRAVENOUS at 21:48

## 2024-02-11 RX ADMIN — PIPERACILLIN SODIUM AND TAZOBACTAM SODIUM 3.38 G: 3; .375 INJECTION, SOLUTION INTRAVENOUS at 21:48

## 2024-02-11 RX ADMIN — SODIUM CHLORIDE 10 ML: 9 INJECTION, SOLUTION INTRAVENOUS at 06:01

## 2024-02-11 RX ADMIN — ENOXAPARIN SODIUM 40 MG: 40 INJECTION SUBCUTANEOUS at 11:13

## 2024-02-11 RX ADMIN — ACETAMINOPHEN 650 MG: 325 TABLET ORAL at 00:29

## 2024-02-11 ASSESSMENT — PAIN SCALES - GENERAL
PAINLEVEL_OUTOF10: 3
PAINLEVEL_OUTOF10: 3
PAINLEVEL_OUTOF10: 2
PAINLEVEL_OUTOF10: 3
PAINLEVEL_OUTOF10: 0 - NO PAIN
PAINLEVEL_OUTOF10: 0 - NO PAIN

## 2024-02-11 ASSESSMENT — PAIN - FUNCTIONAL ASSESSMENT
PAIN_FUNCTIONAL_ASSESSMENT: 0-10

## 2024-02-11 ASSESSMENT — COGNITIVE AND FUNCTIONAL STATUS - GENERAL
DAILY ACTIVITIY SCORE: 24
MOBILITY SCORE: 24
MOBILITY SCORE: 24
DAILY ACTIVITIY SCORE: 24

## 2024-02-11 ASSESSMENT — PAIN DESCRIPTION - LOCATION
LOCATION: ABDOMEN
LOCATION: HEAD
LOCATION: ABDOMEN

## 2024-02-11 ASSESSMENT — PAIN DESCRIPTION - DESCRIPTORS
DESCRIPTORS: ACHING

## 2024-02-11 NOTE — CARE PLAN
Problem: Pain - Adult  Goal: Verbalizes/displays adequate comfort level or baseline comfort level  Outcome: Progressing     Problem: Safety - Adult  Goal: Free from fall injury  Outcome: Progressing     Problem: Discharge Planning  Goal: Discharge to home or other facility with appropriate resources  Outcome: Progressing     Problem: Chronic Conditions and Co-morbidities  Goal: Patient's chronic conditions and co-morbidity symptoms are monitored and maintained or improved  Outcome: Progressing     Problem: Nutrition  Goal: Less than 5 days NPO/clear liquids  Outcome: Progressing  Goal: Oral intake greater 75%  Outcome: Progressing  Goal: Consume prescribed supplement  Outcome: Progressing  Goal: Adequate PO fluid intake  Outcome: Progressing  Goal: Lab values WNL  Outcome: Progressing  Goal: Electrolytes WNL  Outcome: Progressing  Goal: Maintain stable weight  Outcome: Progressing     Problem: Pain  Goal: Walks with improved pain control throughout the shift  Outcome: Progressing  Goal: Performs ADL's with improved pain control throughout shift  Outcome: Progressing  Goal: Participates in PT with improved pain control throughout the shift  Outcome: Progressing  Goal: Free from opioid side effects throughout the shift  Outcome: Progressing  Goal: Free from acute confusion related to pain meds throughout the shift  Outcome: Progressing   The patient's goals for the shift include      The clinical goals for the shift include Patient will tolerate increase in diet throughout shift.

## 2024-02-11 NOTE — CARE PLAN
Problem: Pain - Adult  Goal: Verbalizes/displays adequate comfort level or baseline comfort level  Outcome: Progressing     Problem: Pain - Adult  Goal: Verbalizes/displays adequate comfort level or baseline comfort level  Outcome: Progressing   The patient's goals for the shift include      The clinical goals for the shift include pain control during this shift    Over the shift, the patient did make progress toward the following goals.

## 2024-02-11 NOTE — PROGRESS NOTES
Infectious Diseases Inpatient Progress Note          HISTORY OF PRESENT ILLNESS:  Follow up acute perforated appendicitis with abscess, status post CT-guided drainage with drain placement, influenza A infection on IV Zosyn, well tolerated.  Patient has persistent right lower quadrant abdominal pain, worse upon coughing.   Positive dry cough, sporadic in nature.  No shortness of breath.   Denies any weakness.  Positive bowel movements.  He had constipation for 9 days.   Feels better overall.  No fevers.  Small amount of drainage in the drain.   Cultures with E. coli and pending susceptibility      Current Medications:    enoxaparin, 40 mg, subcutaneous, Daily  piperacillin-tazobactam, 3.375 g, intravenous, q8h   And  sodium chloride, 10 mL, intravenous, q8h        Allergies:  Patient has no known allergies.      Review of Systems  14 system review is negative other than HPI    Physical Exam    Heart Rate:  [61-71]   Temp:  [35.7 °C (96.3 °F)-36.7 °C (98.1 °F)]   Resp:  [16-17]   BP: (107-111)/(63-70)   SpO2:  [95 %-97 %]    Vitals:    02/10/24 1037 02/10/24 1324 02/10/24 2117 02/11/24 0500   BP: 110/66 108/63 111/70 107/65   BP Location: Left arm  Left arm Right arm   Patient Position: Lying Lying Lying Lying   Pulse: 67 71 67 61   Resp: 16 16 17 16   Temp: 36.6 °C (97.9 °F) 36.5 °C (97.7 °F) 36.7 °C (98.1 °F) 35.7 °C (96.3 °F)   TempSrc: Temporal Temporal Temporal Temporal   SpO2: 98% 95% 95% 97%   Weight:       Height:         General Appearance: alert and oriented to person, place and time, well-developed and well-nourished, in no acute distress  Skin: warm and dry, no rash.   Head: normocephalic and atraumatic  Eyes: anicteric sclerae  ENT:  normal mucous membranes. No oral thrush  Lungs: normal respiratory effort, clear lungs, diminished breath sounds bilateral lung fields  Heart normal S1-S2 no murmur  Abdomen: soft, no tenderness, positive right lower quadrant drain, positive bowel sounds  No leg edema  No  erythema, no tenderness    DATA:    Lab Results   Component Value Date    WBC 10.4 02/11/2024    HGB 13.4 (L) 02/11/2024    HCT 39.7 (L) 02/11/2024    MCV 87 02/11/2024     02/11/2024     Lab Results   Component Value Date    CREATININE 0.93 02/11/2024    BUN 6 02/11/2024     02/11/2024    K 3.8 02/11/2024     02/11/2024    CO2 29 02/11/2024       Hepatic Function Panel:  Lab Results   Component Value Date    ALKPHOS 65 02/11/2024    ALT 23 02/11/2024    AST 13 02/11/2024    PROT 6.1 (L) 02/11/2024    BILITOT 0.6 02/11/2024       Microbiology:   Susceptibility data from last 90 days.  Collected Specimen Info Organism   02/09/24 Fluid from Intra-abdominal Abscess Escherichia coli      Imaging:   CT guided imaging for abscess drain    Result Date: 2/9/2024  Interpreted By:  Cristian Arora, STUDY: CT GUIDED IMAGING FOR ABSCESS DRAIN;  2/9/2024 12:09 pm   INDICATION: Signs/Symptoms:Perforated appendicitis with abscess, needs drain.   COMPARISON: CT abdomen/pelvis of 02/09/2024 and 02/08/2024.   ACCESSION NUMBER(S): [id number]   ORDERING CLINICIAN: JEFF SAMAYOA   TECHNIQUE: Having explained the risks, benefits, and alternatives to the planned CT-guided abscess drainage, informed consent was obtained and placed on the chart. A time-out was performed to confirm patient identity and the appropriate procedure.   Patient was placed supine on the CT table. Initial unenhanced CT images were obtained through the lower abdomen for procedure planning. The right lower quadrant abscess was targeted from a right anterolateral approach and skin site marked. Skin was prepped and draped in the usual sterile fashion. Local anesthesia was achieved with 2% lidocaine. Using CT guidance, a 19 gauge introducer needle was advanced into the abscess with return of blood-tinged purulent fluid. Using Seldinger technique with serial dilation a 10.5 [place] all-purpose pigtail drainage catheter was advanced into the abscess  cavity. Catheter was secured with a Stay Fix bandage and attached to a gravity drainage bag. Approximate 5 mL aspirate of fluid was placed in sterile container for potential laboratory analysis. Patient tolerated the procedure well.   FINDINGS: Initial planning CT images again show a right lower quadrant gas containing fluid collection in proximity to the enlarged thickened poorly defined appendix. Procedural and post procedural images show good position of the pigtail drainage catheter coiled within the abscess cavity.       Successful CT-guided right lower quadrant perforated appendicitis related abscess drainage.   MACRO: None.   Signed by: Cristian Arora 2/9/2024 12:20 PM Dictation workstation:   [id number]    CT abdomen pelvis w IV contrast    Result Date: 2/9/2024  Interpreted By:  Cristian Arora, STUDY: CT ABDOMEN PELVIS W IV CONTRAST;  2/9/2024 10:14 am   INDICATION: Signs/Symptoms:Perforated appendicitis with abscess, further evaluation of the abscess.   COMPARISON: 02/08/2024.   ACCESSION NUMBER(S): [id number]   ORDERING CLINICIAN: JEFF SAMAYOA   TECHNIQUE: Contiguous axial images were obtained through the abdomen and pelvis after the administration of  75 mL Omnipaque 350 intravenous contrast. Coronal and sagittal reformations were made.   FINDINGS: LOWER CHEST: Bibasilar mild predominantly dependent atelectasis is present.   ABDOMEN:   LIVER: Small low-attenuation region of the left hepatic lobe anteriorly near the falciform ligament likely is due to focal fatty infiltration or transient perfusion variation.   BILE DUCTS: Nondilated.   GALLBLADDER: The gallbladder is not distended and without calcified stones.   PANCREAS: Within normal limits.   SPLEEN: Within normal limits.   ADRENAL GLANDS: Within normal limits.   KIDNEYS AND URETERS: The kidneys enhance symmetrically without focal lesion. There is some early contrast excretion in both renal collecting systems. No hydroureteronephrosis  bilaterally.   Prostate is enlarged protruding into the base of the urinary bladder. Small dystrophic calcification present in the prostate.   VESSELS: Few small scattered atherosclerotic calcifications are seen in the aorta and branch vessels. No aortic aneurysm. IVC is unremarkable.   BOWEL/PERITONEUM: Changes of perforated appendicitis with associated abscess are more clearly defined with contrast. The right lower quadrant gas containing fluid collection shows peripheral enhancement measuring approximately 5.8 x 3.5 x 6.5 cm in transverse, AP, and CC dimensions respectively. The base of the appendix appears mildly enlarged and thickened extending into this collection. The mid-distal aspect of the appendix extends into the collection and is poorly defined due to rupture. There is inflammatory fat stranding surrounding the abscess along with trace fluid in the right lower quadrant and mild wall thickening of the cecum.   No bowel obstruction. Mild mesenteric edema is present. No additional distant free intraperitoneal air.   Scattered small distal colonic diverticula are present without acute diverticulitis.   RETROPERITONEUM/LYMPH NODES: No retroperitoneal fluid collection or lymphadenopathy.   ABDOMINAL WALL: Tiny fat containing periumbilical hernia is present without inflammation.   BONE AND SOFT TISSUE: Mild multilevel disc space narrowing and endplate spurring of the visualized spine is greatest at L5-S1.       Findings compatible with ruptured appendicitis and abscess as detailed.   No bowel obstruction.   MACRO: Cristian Arora discussed the significance and urgency of this critical finding via secure chat with  Chiki Samuel on 2/9/2024 at 10:51 am.  (**-RCF-**) Findings:  See findings.   Signed by: Cristian Arora 2/9/2024 10:52 AM Dictation workstation:   [id number]    CT abdomen pelvis wo IV contrast    Result Date: 2/8/2024  Interpreted By:  Harper Witt, STUDY: CT ABDOMEN PELVIS WO IV CONTRAST;   2/8/2024 8:28 am   INDICATION: Signs/Symptoms:RLQ pain; hx of R inguinal hernia repair 2y ago, R/O appy.   COMPARISON: None.   ACCESSION NUMBER(S): [id number]   ORDERING CLINICIAN: ABISAI MATAMOROS   TECHNIQUE: CT of the abdomen and pelvis was performed. No oral, no intravenous contrast.   FINDINGS: LOWER CHEST: Images of the lung bases show no infiltrate or pleural fluid. There is mild bibasilar scarring   ABDOMEN:   LIVER: There is no hepatic mass.   BILE DUCTS: There is no intrahepatic, common hepatic or common bile ductal dilatation.   GALLBLADDER: The gallbladder is unremarkable.   PANCREAS: The pancreas is unremarkable.   SPLEEN: The spleen is unremarkable. There is no splenic mass or splenomegaly.   ADRENAL GLANDS: The adrenal glands are unremarkable.   KIDNEYS AND URETERS: The kidneys demonstrate no mass.  There is no intrarenal calculus or hydronephrosis.   BOWEL: There is marked haziness of the fat in the right lower quadrant. A normal appendix is not seen. There is a 7.6 x 4.7 x 3.1 cm fluid collection in the right lower quadrant containing dots of air. There is hyperdense material perhaps fecaliths from the appendiceal lumen. Findings are consistent with perforated appendicitis.   VESSELS: There is atherosclerotic calcification of the abdominal aorta.   PERITONEUM/RETROPERITONEUM/LYMPH NODES: There is no retroperitoneal or pelvic adenopathy.  There is no ascites.   ABDOMINAL WALL: The abdominal wall is unremarkable.   BONE AND SOFT TISSUE: There is no acute osseous finding.   There is no soft tissue abnormality.       1. Appendicitis with perforation. There is a 7.6 x 4.7 x 3.1 cm abscess in the right lower quadrant. 2. Otherwise unremarkable CT abdomen pelvis.   MACRO: Harper Witt discussed the significance and urgency of this critical finding by secure chat with  ABISAI MATAMOROS on 2/8/2024 at 8:55 am.  (**-RCF-**) Findings:  See findings.   Signed by: Harper Witt 2/8/2024 8:55 AM Dictation  workstation:   [id number]         IMPRESSION:    Acute perforated appendicitis with large abscess improving status post CT-guided drain placement  Polymicrobial bacterial infection including E. Coli with pending susceptibility  Influenza A infection, improving    Patient Active Problem List   Diagnosis    Acute appendicitis with perforation, generalized peritonitis, and abscess, without gangrene       PLAN:  Continue IV Zosyn  Check culture and sensitivities and accordingly adjust antibiotics  Patient may be able to be switched to Invanz on discharge based on sensitivity  Recommend home health care and 4 weeks of IV antibiotics  Midline in a.m. as discussed with the patient who is agreeable with above plan  Patient will need follow-up blood work CT postdischarge and follow-up with surgery    Discussed with patient    Elsie Kinney MD

## 2024-02-11 NOTE — PROGRESS NOTES
General Surgery Progress Note    Patient: Tacho Conway  Unit/Bed: 1109/1109-B  YOB: 1960  MRN: [id number]  Acct: [id number]   Admitting Diagnosis: Acute appendicitis with perforation, generalized peritonitis, and abscess, without gangrene [K35.211]  Date:  2/8/2024  Hospital Day: 3  Attending: Chiki Samuel MD    Complaint:  Chief Complaint   Patient presents with    Abdominal Pain     +Flu Sunday, but not eating, no appetite       Subjective  Patient seen and examined this morning. No acute events overnight. Patient denies nausea, vomiting. Denies ABD pain. States last BM was this morning and was diarrhea. Tolerating diet.     PHYSICAL EXAM:  Physical Exam  Vitals reviewed.   Constitutional:       Appearance: Normal appearance.   HENT:      Head: Normocephalic.      Nose: Nose normal.      Mouth/Throat:      Mouth: Mucous membranes are moist.   Cardiovascular:      Rate and Rhythm: Normal rate.   Pulmonary:      Effort: Pulmonary effort is normal.   Abdominal:      General: Abdomen is flat. Bowel sounds are normal.      Palpations: Abdomen is soft.      Comments: Drain minimal purulent drainage.    Skin:     General: Skin is warm.      Capillary Refill: Capillary refill takes less than 2 seconds.   Neurological:      General: No focal deficit present.      Mental Status: He is alert and oriented to person, place, and time.   Psychiatric:         Mood and Affect: Mood normal.       Vital signs in last 24 hours:  Vitals:    02/11/24 0500   BP: 107/65   Pulse: 61   Resp: 16   Temp: 35.7 °C (96.3 °F)   SpO2: 97%     Intake/Output this shift:    Intake/Output Summary (Last 24 hours) at 2/11/2024 0818  Last data filed at 2/11/2024 0602  Gross per 24 hour   Intake 3172 ml   Output 30 ml   Net 3142 ml        Allergies:  No Known Allergies   Medications:  Scheduled medications  piperacillin-tazobactam, 3.375 g, intravenous, q8h   And  sodium chloride, 10 mL, intravenous, q8h      Continuous medications        PRN medications  PRN medications: acetaminophen **OR** acetaminophen **OR** acetaminophen, acetaminophen **OR** acetaminophen **OR** acetaminophen, ondansetron ODT **OR** ondansetron, oxyCODONE, oxyCODONE  Labs:  Results for orders placed or performed during the hospital encounter of 02/08/24 (from the past 24 hour(s))   POCT GLUCOSE   Result Value Ref Range    POCT Glucose 105 (H) 74 - 99 mg/dL      Imaging:  CT guided imaging for abscess drain    Result Date: 2/9/2024  Interpreted By:  Cristian Arora, STUDY: CT GUIDED IMAGING FOR ABSCESS DRAIN;  2/9/2024 12:09 pm   INDICATION: Signs/Symptoms:Perforated appendicitis with abscess, needs drain.   COMPARISON: CT abdomen/pelvis of 02/09/2024 and 02/08/2024.   ACCESSION NUMBER(S): [id number]   ORDERING CLINICIAN: JEFF SAMAYOA   TECHNIQUE: Having explained the risks, benefits, and alternatives to the planned CT-guided abscess drainage, informed consent was obtained and placed on the chart. A time-out was performed to confirm patient identity and the appropriate procedure.   Patient was placed supine on the CT table. Initial unenhanced CT images were obtained through the lower abdomen for procedure planning. The right lower quadrant abscess was targeted from a right anterolateral approach and skin site marked. Skin was prepped and draped in the usual sterile fashion. Local anesthesia was achieved with 2% lidocaine. Using CT guidance, a 19 gauge introducer needle was advanced into the abscess with return of blood-tinged purulent fluid. Using Seldinger technique with serial dilation a 10.5 [place] all-purpose pigtail drainage catheter was advanced into the abscess cavity. Catheter was secured with a Stay Fix bandage and attached to a gravity drainage bag. Approximate 5 mL aspirate of fluid was placed in sterile container for potential laboratory analysis. Patient tolerated the procedure well.   FINDINGS: Initial planning CT images again show a right lower quadrant  gas containing fluid collection in proximity to the enlarged thickened poorly defined appendix. Procedural and post procedural images show good position of the pigtail drainage catheter coiled within the abscess cavity.       Successful CT-guided right lower quadrant perforated appendicitis related abscess drainage.   MACRO: None.   Signed by: Cristian Arora 2/9/2024 12:20 PM Dictation workstation:   [id number]    CT abdomen pelvis w IV contrast    Result Date: 2/9/2024  Interpreted By:  Cristian Arora, STUDY: CT ABDOMEN PELVIS W IV CONTRAST;  2/9/2024 10:14 am   INDICATION: Signs/Symptoms:Perforated appendicitis with abscess, further evaluation of the abscess.   COMPARISON: 02/08/2024.   ACCESSION NUMBER(S): [id number]   ORDERING CLINICIAN: JEFF SAMAYOA   TECHNIQUE: Contiguous axial images were obtained through the abdomen and pelvis after the administration of  75 mL Omnipaque 350 intravenous contrast. Coronal and sagittal reformations were made.   FINDINGS: LOWER CHEST: Bibasilar mild predominantly dependent atelectasis is present.   ABDOMEN:   LIVER: Small low-attenuation region of the left hepatic lobe anteriorly near the falciform ligament likely is due to focal fatty infiltration or transient perfusion variation.   BILE DUCTS: Nondilated.   GALLBLADDER: The gallbladder is not distended and without calcified stones.   PANCREAS: Within normal limits.   SPLEEN: Within normal limits.   ADRENAL GLANDS: Within normal limits.   KIDNEYS AND URETERS: The kidneys enhance symmetrically without focal lesion. There is some early contrast excretion in both renal collecting systems. No hydroureteronephrosis bilaterally.   Prostate is enlarged protruding into the base of the urinary bladder. Small dystrophic calcification present in the prostate.   VESSELS: Few small scattered atherosclerotic calcifications are seen in the aorta and branch vessels. No aortic aneurysm. IVC is unremarkable.   BOWEL/PERITONEUM: Changes  of perforated appendicitis with associated abscess are more clearly defined with contrast. The right lower quadrant gas containing fluid collection shows peripheral enhancement measuring approximately 5.8 x 3.5 x 6.5 cm in transverse, AP, and CC dimensions respectively. The base of the appendix appears mildly enlarged and thickened extending into this collection. The mid-distal aspect of the appendix extends into the collection and is poorly defined due to rupture. There is inflammatory fat stranding surrounding the abscess along with trace fluid in the right lower quadrant and mild wall thickening of the cecum.   No bowel obstruction. Mild mesenteric edema is present. No additional distant free intraperitoneal air.   Scattered small distal colonic diverticula are present without acute diverticulitis.   RETROPERITONEUM/LYMPH NODES: No retroperitoneal fluid collection or lymphadenopathy.   ABDOMINAL WALL: Tiny fat containing periumbilical hernia is present without inflammation.   BONE AND SOFT TISSUE: Mild multilevel disc space narrowing and endplate spurring of the visualized spine is greatest at L5-S1.       Findings compatible with ruptured appendicitis and abscess as detailed.   No bowel obstruction.   MACRO: Cristian Arora discussed the significance and urgency of this critical finding via secure chat with  Chiki Samuel on 2/9/2024 at 10:51 am.  (**-RCF-**) Findings:  See findings.   Signed by: Cristian Arora 2/9/2024 10:52 AM Dictation workstation:   [id number]     Assessment  Perforated acute appendicitis with abscess, IR placed drain 2/9/24    On exam ABD is soft, non distended and non tender. No pain elicited with palpation. Bowel sounds present x 4 quadrant. Minimal purulent drainage from drain. Afebrile. Labs unremarkable this morning.     Plan  -Continue diet as tolerated.  -Pain control  -Nausea control  -Continue IV ABX until ID weighs in  -DVT prophylaxis-lovenox  -Pulmonary toileting    -Encourage ambulation  -Awaiting ID recommendations       Further recommendations per Dr. Carter Burns, KANG-CNP    Time spent  37  minutes obtaining labs, imaging, recommendations, interview, assessment, examination, medication review/ordering, and EMR review.    Plan of care was discussed extensively with patient. Patient verbalized understanding through teach back method. All questions and concerns addressed upon examination.     Of note, this documentation is completed using the Dragon Dictation system (voice recognition software). There may be spelling and/or grammatical errors that were not corrected prior to final submission.     Agree with note above except noted below    No acute events overnight, patient is feeling much better, tolerating diet, white cell count is normal    Physical exam patient is in no acute distress, abdomen soft, nondistended, minimally tender to palpation to deep palpation in the right lower quadrant, [place] is purulent    Perforated appendicitis with large abscess status post IR drain placed on 9 February, doing well  -Cont. IV abx, follow up ID consult  -Regular diet  -Pain control  -OOB, Scds, ISS< DVT proph  -Will likely need dispo planning    Roger Machado MD  02/11/24  11:06 AM

## 2024-02-12 ENCOUNTER — APPOINTMENT (OUTPATIENT)
Dept: RADIOLOGY | Facility: HOSPITAL | Age: 64
DRG: 373 | End: 2024-02-12
Payer: COMMERCIAL

## 2024-02-12 ENCOUNTER — HOME HEALTH ADMISSION (OUTPATIENT)
Dept: HOME HEALTH SERVICES | Facility: HOME HEALTH | Age: 64
End: 2024-02-12
Payer: COMMERCIAL

## 2024-02-12 LAB
B-LACTAMASE ORGANISM ISLT: POSITIVE
BACTERIA FLD CULT: ABNORMAL
GRAM STN SPEC: ABNORMAL
GRAM STN SPEC: ABNORMAL

## 2024-02-12 PROCEDURE — 99232 SBSQ HOSP IP/OBS MODERATE 35: CPT | Performed by: SURGERY

## 2024-02-12 PROCEDURE — 1210000001 HC SEMI-PRIVATE ROOM DAILY

## 2024-02-12 PROCEDURE — 2500000004 HC RX 250 GENERAL PHARMACY W/ HCPCS (ALT 636 FOR OP/ED)

## 2024-02-12 PROCEDURE — 2500000001 HC RX 250 WO HCPCS SELF ADMINISTERED DRUGS (ALT 637 FOR MEDICARE OP): Performed by: REGISTERED NURSE

## 2024-02-12 PROCEDURE — RXMED WILLOW AMBULATORY MEDICATION CHARGE

## 2024-02-12 PROCEDURE — 99233 SBSQ HOSP IP/OBS HIGH 50: CPT | Performed by: REGISTERED NURSE

## 2024-02-12 PROCEDURE — 36410 VNPNXR 3YR/> PHY/QHP DX/THER: CPT

## 2024-02-12 RX ORDER — ERTAPENEM 1 G/1
1 INJECTION, POWDER, LYOPHILIZED, FOR SOLUTION INTRAMUSCULAR; INTRAVENOUS EVERY 24 HOURS
Status: DISCONTINUED | OUTPATIENT
Start: 2024-02-13 | End: 2024-02-13 | Stop reason: HOSPADM

## 2024-02-12 RX ORDER — OXYCODONE HYDROCHLORIDE 5 MG/1
5 TABLET ORAL EVERY 6 HOURS PRN
Qty: 20 TABLET | Refills: 0 | Status: SHIPPED | OUTPATIENT
Start: 2024-02-12 | End: 2024-04-22 | Stop reason: ALTCHOICE

## 2024-02-12 RX ORDER — ERTAPENEM 1 G/1
1 INJECTION, POWDER, LYOPHILIZED, FOR SOLUTION INTRAMUSCULAR; INTRAVENOUS DAILY
Qty: 30 G | Refills: 0 | Status: SHIPPED
Start: 2024-02-12 | End: 2024-03-13

## 2024-02-12 RX ORDER — ERTAPENEM 1 G/1
1 INJECTION, POWDER, LYOPHILIZED, FOR SOLUTION INTRAMUSCULAR; INTRAVENOUS EVERY 24 HOURS
Status: DISCONTINUED | OUTPATIENT
Start: 2024-02-12 | End: 2024-02-12

## 2024-02-12 RX ADMIN — PIPERACILLIN SODIUM AND TAZOBACTAM SODIUM 3.38 G: 3; .375 INJECTION, SOLUTION INTRAVENOUS at 13:03

## 2024-02-12 RX ADMIN — SODIUM CHLORIDE 10 ML: 9 INJECTION, SOLUTION INTRAVENOUS at 14:00

## 2024-02-12 RX ADMIN — OXYCODONE HYDROCHLORIDE 5 MG: 5 TABLET ORAL at 17:13

## 2024-02-12 RX ADMIN — SODIUM CHLORIDE 10 ML: 9 INJECTION, SOLUTION INTRAVENOUS at 06:03

## 2024-02-12 RX ADMIN — PIPERACILLIN SODIUM AND TAZOBACTAM SODIUM 3.38 G: 3; .375 INJECTION, SOLUTION INTRAVENOUS at 06:04

## 2024-02-12 ASSESSMENT — COGNITIVE AND FUNCTIONAL STATUS - GENERAL
MOBILITY SCORE: 24
DAILY ACTIVITIY SCORE: 24
DAILY ACTIVITIY SCORE: 24
MOBILITY SCORE: 24

## 2024-02-12 ASSESSMENT — PAIN - FUNCTIONAL ASSESSMENT
PAIN_FUNCTIONAL_ASSESSMENT: 0-10

## 2024-02-12 ASSESSMENT — PAIN SCALES - GENERAL
PAINLEVEL_OUTOF10: 4
PAINLEVEL_OUTOF10: 0 - NO PAIN
PAINLEVEL_OUTOF10: 0 - NO PAIN
PAINLEVEL_OUTOF10: 6

## 2024-02-12 NOTE — PROGRESS NOTES
General Surgery Progress Note    Patient: Tacho Conway  Unit/Bed: 1109/1109-B  YOB: 1960  MRN: [id number]  Acct: [id number]   Admitting Diagnosis: Acute appendicitis with perforation, generalized peritonitis, and abscess, without gangrene [K35.211]  Date:  2/8/2024  Hospital Day: 4  Attending: Chiki Samuel MD    Complaint:  Chief Complaint   Patient presents with    Abdominal Pain     +Flu Sunday, but not eating, no appetite       Subjective  Patient seen and examined this morning. No acute events overnight. Patient denies nausea, vomiting. Denies ABD pain. States last BM was this morning. Tolerating diet. Anxious to go home.     PHYSICAL EXAM:  Physical Exam  Vitals reviewed.   Constitutional:       Appearance: Normal appearance.   HENT:      Head: Normocephalic.      Nose: Nose normal.      Mouth/Throat:      Mouth: Mucous membranes are moist.   Cardiovascular:      Rate and Rhythm: Normal rate.   Pulmonary:      Effort: Pulmonary effort is normal.   Abdominal:      General: Abdomen is flat. Bowel sounds are normal.      Palpations: Abdomen is soft.      Comments: Drain minimal purulent drainage.    Skin:     General: Skin is warm.      Capillary Refill: Capillary refill takes less than 2 seconds.   Neurological:      General: No focal deficit present.      Mental Status: He is alert and oriented to person, place, and time.   Psychiatric:         Mood and Affect: Mood normal.       Vital signs in last 24 hours:  Vitals:    02/12/24 0430   BP: 112/71   Pulse: 61   Resp: 18   Temp: 36.6 °C (97.9 °F)   SpO2: 97%     Intake/Output this shift:    Intake/Output Summary (Last 24 hours) at 2/12/2024 0803  Last data filed at 2/12/2024 0625  Gross per 24 hour   Intake 63.67 ml   Output 42 ml   Net 21.67 ml        Allergies:  No Known Allergies   Medications:  Scheduled medications  enoxaparin, 40 mg, subcutaneous, Daily  lidocaine, 5 mL, infiltration, Once  piperacillin-tazobactam, 3.375 g, intravenous,  q8h   And  sodium chloride, 10 mL, intravenous, q8h      Continuous medications       PRN medications  PRN medications: acetaminophen **OR** acetaminophen **OR** acetaminophen, acetaminophen **OR** acetaminophen **OR** acetaminophen, ondansetron ODT **OR** ondansetron, oxyCODONE, oxyCODONE  Labs:  Results for orders placed or performed during the hospital encounter of 02/08/24 (from the past 24 hour(s))   SST TOP   Result Value Ref Range    Extra Tube Hold for add-ons.    CBC   Result Value Ref Range    WBC 10.4 4.4 - 11.3 x10*3/uL    nRBC 0.0 0.0 - 0.0 /100 WBCs    RBC 4.58 4.50 - 5.90 x10*6/uL    Hemoglobin 13.4 (L) 13.5 - 17.5 g/dL    Hematocrit 39.7 (L) 41.0 - 52.0 %    MCV 87 80 - 100 fL    MCH 29.3 26.0 - 34.0 pg    MCHC 33.8 32.0 - 36.0 g/dL    RDW 12.7 11.5 - 14.5 %    Platelets 310 150 - 450 x10*3/uL   Comprehensive Metabolic Panel   Result Value Ref Range    Glucose 116 (H) 74 - 99 mg/dL    Sodium 137 136 - 145 mmol/L    Potassium 3.8 3.5 - 5.3 mmol/L    Chloride 101 98 - 107 mmol/L    Bicarbonate 29 21 - 32 mmol/L    Anion Gap 11 10 - 20 mmol/L    Urea Nitrogen 6 6 - 23 mg/dL    Creatinine 0.93 0.50 - 1.30 mg/dL    eGFR >90 >60 mL/min/1.73m*2    Calcium 8.5 (L) 8.6 - 10.3 mg/dL    Albumin 3.0 (L) 3.4 - 5.0 g/dL    Alkaline Phosphatase 65 33 - 136 U/L    Total Protein 6.1 (L) 6.4 - 8.2 g/dL    AST 13 9 - 39 U/L    Bilirubin, Total 0.6 0.0 - 1.2 mg/dL    ALT 23 10 - 52 U/L   Magnesium   Result Value Ref Range    Magnesium 1.97 1.60 - 2.40 mg/dL      Imaging:  CT guided imaging for abscess drain    Result Date: 2/9/2024  Interpreted By:  Cristian Arora, STUDY: CT GUIDED IMAGING FOR ABSCESS DRAIN;  2/9/2024 12:09 pm   INDICATION: Signs/Symptoms:Perforated appendicitis with abscess, needs drain.   COMPARISON: CT abdomen/pelvis of 02/09/2024 and 02/08/2024.   ACCESSION NUMBER(S): [id number]   ORDERING CLINICIAN: JEFF SAMAYOA   TECHNIQUE: Having explained the risks, benefits, and alternatives to the planned  CT-guided abscess drainage, informed consent was obtained and placed on the chart. A time-out was performed to confirm patient identity and the appropriate procedure.   Patient was placed supine on the CT table. Initial unenhanced CT images were obtained through the lower abdomen for procedure planning. The right lower quadrant abscess was targeted from a right anterolateral approach and skin site marked. Skin was prepped and draped in the usual sterile fashion. Local anesthesia was achieved with 2% lidocaine. Using CT guidance, a 19 gauge introducer needle was advanced into the abscess with return of blood-tinged purulent fluid. Using Seldinger technique with serial dilation a 10.5 Ivorian all-purpose pigtail drainage catheter was advanced into the abscess cavity. Catheter was secured with a Stay Fix bandage and attached to a gravity drainage bag. Approximate 5 mL aspirate of fluid was placed in sterile container for potential laboratory analysis. Patient tolerated the procedure well.   FINDINGS: Initial planning CT images again show a right lower quadrant gas containing fluid collection in proximity to the enlarged thickened poorly defined appendix. Procedural and post procedural images show good position of the pigtail drainage catheter coiled within the abscess cavity.       Successful CT-guided right lower quadrant perforated appendicitis related abscess drainage.   MACRO: None.   Signed by: Cristian Arora 2/9/2024 12:20 PM Dictation workstation:   [id number]    CT abdomen pelvis w IV contrast    Result Date: 2/9/2024  Interpreted By:  Cristian Arora, STUDY: CT ABDOMEN PELVIS W IV CONTRAST;  2/9/2024 10:14 am   INDICATION: Signs/Symptoms:Perforated appendicitis with abscess, further evaluation of the abscess.   COMPARISON: 02/08/2024.   ACCESSION NUMBER(S): [id number]   ORDERING CLINICIAN: JEFF SAMAYOA   TECHNIQUE: Contiguous axial images were obtained through the abdomen and pelvis after the  administration of  75 mL Omnipaque 350 intravenous contrast. Coronal and sagittal reformations were made.   FINDINGS: LOWER CHEST: Bibasilar mild predominantly dependent atelectasis is present.   ABDOMEN:   LIVER: Small low-attenuation region of the left hepatic lobe anteriorly near the falciform ligament likely is due to focal fatty infiltration or transient perfusion variation.   BILE DUCTS: Nondilated.   GALLBLADDER: The gallbladder is not distended and without calcified stones.   PANCREAS: Within normal limits.   SPLEEN: Within normal limits.   ADRENAL GLANDS: Within normal limits.   KIDNEYS AND URETERS: The kidneys enhance symmetrically without focal lesion. There is some early contrast excretion in both renal collecting systems. No hydroureteronephrosis bilaterally.   Prostate is enlarged protruding into the base of the urinary bladder. Small dystrophic calcification present in the prostate.   VESSELS: Few small scattered atherosclerotic calcifications are seen in the aorta and branch vessels. No aortic aneurysm. IVC is unremarkable.   BOWEL/PERITONEUM: Changes of perforated appendicitis with associated abscess are more clearly defined with contrast. The right lower quadrant gas containing fluid collection shows peripheral enhancement measuring approximately 5.8 x 3.5 x 6.5 cm in transverse, AP, and CC dimensions respectively. The base of the appendix appears mildly enlarged and thickened extending into this collection. The mid-distal aspect of the appendix extends into the collection and is poorly defined due to rupture. There is inflammatory fat stranding surrounding the abscess along with trace fluid in the right lower quadrant and mild wall thickening of the cecum.   No bowel obstruction. Mild mesenteric edema is present. No additional distant free intraperitoneal air.   Scattered small distal colonic diverticula are present without acute diverticulitis.   RETROPERITONEUM/LYMPH NODES: No retroperitoneal  fluid collection or lymphadenopathy.   ABDOMINAL WALL: Tiny fat containing periumbilical hernia is present without inflammation.   BONE AND SOFT TISSUE: Mild multilevel disc space narrowing and endplate spurring of the visualized spine is greatest at L5-S1.       Findings compatible with ruptured appendicitis and abscess as detailed.   No bowel obstruction.   MACRO: Cristian Arora discussed the significance and urgency of this critical finding via secure chat with  Chiki Samuel on 2/9/2024 at 10:51 am.  (**-RCF-**) Findings:  See findings.   Signed by: Cristian Arora 2/9/2024 10:52 AM Dictation workstation:   [id number]     Assessment  Perforated acute appendicitis with abscess, IR placed drain 2/9/24    On exam ABD is soft, non distended and non tender. Pain elicited with palpation to RLQ over drain site. Bowel sounds present x 4 quadrant. Minimal purulent drainage from drain. Afebrile. Labs unremarkable this morning.     Plan  -Continue diet as tolerated.  -Pain control  -Nausea control  -DVT prophylaxis-lovenox  -Pulmonary toileting   -Encourage ambulation  -Continue IV Zosyn for now per ID  -Midline placement ordered per ID.   -Culture showing E.coli and pending susceptibility per ID       Further recommendations per Dr. Carter Burns, APRN-CNP    Time spent  37  minutes obtaining labs, imaging, recommendations, interview, assessment, examination, medication review/ordering, and EMR review.    Plan of care was discussed extensively with patient. Patient verbalized understanding through teach back method. All questions and concerns addressed upon examination.     Of note, this documentation is completed using the Dragon Dictation system (voice recognition software). There may be spelling and/or grammatical errors that were not corrected prior to final submission.

## 2024-02-12 NOTE — PROGRESS NOTES
02/12/24 1548   Discharge Planning   Living Arrangements Spouse/significant other   Support Systems Spouse/significant other   Type of Residence Private residence   Patient expects to be discharged to: Home with [facility] for IV infusion   Does the patient need discharge transport arranged? No     Script for IV Invanz & HHC orders sent to [facility] per surgical NP, Gabby Burns.  Discussed with pt who is in agreement with plan. Requested SOC.

## 2024-02-12 NOTE — DISCHARGE INSTRUCTIONS
Please call the office to schedule a follow up appointment with Dr. Samuel in 2 weeks.     Please call the office to schedule a follow up appointment with infectious disease in 2-3 weeks also.    Call office or come to ER if:  You have a fever of 100.4 Fahrenheit  You cannot tolerate food or water  Unable to urinate  New or worsening of symptoms  Chest pain or shortness of breath    You may shower, no baths. Pat areas dry over midline and percutaneous tube. If dressing over tube gets wet dressing will need to be changed.  You may resume normal diet  Do not lift or do anything strenuous with arm that has midline in.   [facility] infusion should be coming out to explain how to give infusions and how to cover midline while showering and will change midline dressing.     Remember you will get weekly lab draws and you will get a CT of the abdomen and pelvis in 2 weeks.

## 2024-02-13 ENCOUNTER — PHARMACY VISIT (OUTPATIENT)
Dept: PHARMACY | Facility: CLINIC | Age: 64
End: 2024-02-13
Payer: COMMERCIAL

## 2024-02-13 ENCOUNTER — APPOINTMENT (OUTPATIENT)
Dept: RADIOLOGY | Facility: HOSPITAL | Age: 64
DRG: 373 | End: 2024-02-13
Payer: COMMERCIAL

## 2024-02-13 ENCOUNTER — DOCUMENTATION (OUTPATIENT)
Dept: HOME HEALTH SERVICES | Facility: HOME HEALTH | Age: 64
End: 2024-02-13
Payer: COMMERCIAL

## 2024-02-13 VITALS
HEART RATE: 74 BPM | HEIGHT: 71 IN | DIASTOLIC BLOOD PRESSURE: 69 MMHG | OXYGEN SATURATION: 96 % | RESPIRATION RATE: 18 BRPM | TEMPERATURE: 98.1 F | BODY MASS INDEX: 24.78 KG/M2 | WEIGHT: 177 LBS | SYSTOLIC BLOOD PRESSURE: 114 MMHG

## 2024-02-13 PROCEDURE — 2500000005 HC RX 250 GENERAL PHARMACY W/O HCPCS: Performed by: INTERNAL MEDICINE

## 2024-02-13 PROCEDURE — 2780000003 HC OR 278 NO HCPCS

## 2024-02-13 PROCEDURE — C1751 CATH, INF, PER/CENT/MIDLINE: HCPCS

## 2024-02-13 PROCEDURE — 2500000004 HC RX 250 GENERAL PHARMACY W/ HCPCS (ALT 636 FOR OP/ED): Performed by: REGISTERED NURSE

## 2024-02-13 PROCEDURE — 99233 SBSQ HOSP IP/OBS HIGH 50: CPT | Performed by: REGISTERED NURSE

## 2024-02-13 PROCEDURE — 36410 VNPNXR 3YR/> PHY/QHP DX/THER: CPT

## 2024-02-13 RX ADMIN — ERTAPENEM SODIUM 1 G: 1 INJECTION, POWDER, LYOPHILIZED, FOR SOLUTION INTRAMUSCULAR; INTRAVENOUS at 08:50

## 2024-02-13 RX ADMIN — LIDOCAINE HYDROCHLORIDE 10 MG: 10 INJECTION, SOLUTION INFILTRATION; PERINEURAL at 09:35

## 2024-02-13 ASSESSMENT — COGNITIVE AND FUNCTIONAL STATUS - GENERAL
MOBILITY SCORE: 24
DAILY ACTIVITIY SCORE: 24

## 2024-02-13 ASSESSMENT — PAIN SCALES - GENERAL
PAINLEVEL_OUTOF10: 0 - NO PAIN
PAINLEVEL_OUTOF10: 0 - NO PAIN

## 2024-02-13 ASSESSMENT — PAIN - FUNCTIONAL ASSESSMENT
PAIN_FUNCTIONAL_ASSESSMENT: 0-10
PAIN_FUNCTIONAL_ASSESSMENT: 0-10

## 2024-02-13 NOTE — PROGRESS NOTES
"Tacho Conway is a 63 y.o. male on day 4 of admission presenting with Acute appendicitis with perforation, generalized peritonitis, and abscess, without gangrene.    Subjective   Pain is better, afebrile, tolerating diet       Objective     Physical Exam  Constitutional: no acute distress, well appearing and well nourished  Pulmonary: normal respiratory effort; clear to auscultation bilaterally  Cardiovascular: regular rate and rhythm, no murmurs or extra-heart sounds  Abdomen: soft, minimal tenderness in RLQ, drain with purulent output  Musculoskeletal: digits and nails normal without clubbing or cyanosis  Psychiatric: oriented to person, place and time  Last Recorded Vitals  Blood pressure 115/69, pulse 60, temperature 36.5 °C (97.7 °F), resp. rate 18, height 1.803 m (5' 11\"), weight 80.3 kg (177 lb), SpO2 96 %.  Intake/Output last 3 Shifts:  I/O last 3 completed shifts:  In: 63.7 (0.8 mL/kg) [IV Piggyback:63.7]  Out: 42 (0.5 mL/kg) [Drains:42]  Weight: 80.3 kg     Relevant Results                            Assessment/Plan   Principal Problem:    Acute appendicitis with perforation, generalized peritonitis, and abscess, without gangrene  Patient is doing well overall.  Appreciate ID input.  Will plan to repeat CT scan in 1 to 2 weeks.  I told patient he will need interval appendectomy in 6 to 8 weeks and prior to that he will need colonoscopy since he has never had one; all questions answered.           Chiki Samuel MD      "

## 2024-02-13 NOTE — PROGRESS NOTES
General Surgery Progress Note    Patient: Tacho Conway  Unit/Bed: 1109/1109-B  YOB: 1960  MRN: [id number]  Acct: [id number]   Admitting Diagnosis: Acute appendicitis with perforation, generalized peritonitis, and abscess, without gangrene [K35.211]  Date:  2/8/2024  Hospital Day: 5  Attending: Chiki Samuel MD    Complaint:  Chief Complaint   Patient presents with    Abdominal Pain     +Flu Sunday, but not eating, no appetite       Subjective  Patient seen and examined this morning. No acute events overnight. Patient denies nausea, vomiting. Denies ABD pain. States last BM was this morning. Tolerating diet. Ready to go home.     PHYSICAL EXAM:  Physical Exam  Vitals reviewed.   Constitutional:       Appearance: Normal appearance.   HENT:      Head: Normocephalic.      Nose: Nose normal.      Mouth/Throat:      Mouth: Mucous membranes are moist.   Cardiovascular:      Rate and Rhythm: Normal rate.   Pulmonary:      Effort: Pulmonary effort is normal.   Abdominal:      General: Abdomen is flat. Bowel sounds are normal.      Palpations: Abdomen is soft.      Comments: Drain minimal purulent drainage.    Skin:     General: Skin is warm.      Capillary Refill: Capillary refill takes less than 2 seconds.   Neurological:      General: No focal deficit present.      Mental Status: He is alert and oriented to person, place, and time.   Psychiatric:         Mood and Affect: Mood normal.       Vital signs in last 24 hours:  Vitals:    02/13/24 0514   BP: 104/52   Pulse: 64   Resp: 18   Temp: 36.6 °C (97.9 °F)   SpO2: 97%     Intake/Output this shift:  No intake or output data in the 24 hours ending 02/13/24 0748     Allergies:  No Known Allergies   Medications:  Scheduled medications  enoxaparin, 40 mg, subcutaneous, Daily  ertapenem, 1 g, intravenous, q24h  lidocaine, 5 mL, infiltration, Once      Continuous medications       PRN medications  PRN medications: acetaminophen **OR** acetaminophen **OR**  acetaminophen, acetaminophen **OR** acetaminophen **OR** acetaminophen, ondansetron ODT **OR** ondansetron, oxyCODONE, oxyCODONE  Labs:  No results found for this or any previous visit (from the past 24 hour(s)).     Imaging:  CT guided imaging for abscess drain    Result Date: 2/9/2024  Interpreted By:  Cristian Arora, STUDY: CT GUIDED IMAGING FOR ABSCESS DRAIN;  2/9/2024 12:09 pm   INDICATION: Signs/Symptoms:Perforated appendicitis with abscess, needs drain.   COMPARISON: CT abdomen/pelvis of 02/09/2024 and 02/08/2024.   ACCESSION NUMBER(S): [id number]   ORDERING CLINICIAN: JEFF SAMAYOA   TECHNIQUE: Having explained the risks, benefits, and alternatives to the planned CT-guided abscess drainage, informed consent was obtained and placed on the chart. A time-out was performed to confirm patient identity and the appropriate procedure.   Patient was placed supine on the CT table. Initial unenhanced CT images were obtained through the lower abdomen for procedure planning. The right lower quadrant abscess was targeted from a right anterolateral approach and skin site marked. Skin was prepped and draped in the usual sterile fashion. Local anesthesia was achieved with 2% lidocaine. Using CT guidance, a 19 gauge introducer needle was advanced into the abscess with return of blood-tinged purulent fluid. Using Seldinger technique with serial dilation a 10.5 [place]uan all-purpose pigtail drainage catheter was advanced into the abscess cavity. Catheter was secured with a Stay Fix bandage and attached to a gravity drainage bag. Approximate 5 mL aspirate of fluid was placed in sterile container for potential laboratory analysis. Patient tolerated the procedure well.   FINDINGS: Initial planning CT images again show a right lower quadrant gas containing fluid collection in proximity to the enlarged thickened poorly defined appendix. Procedural and post procedural images show good position of the pigtail drainage catheter coiled  within the abscess cavity.       Successful CT-guided right lower quadrant perforated appendicitis related abscess drainage.   MACRO: None.   Signed by: Cristian Arora 2/9/2024 12:20 PM Dictation workstation:   [id number]    CT abdomen pelvis w IV contrast    Result Date: 2/9/2024  Interpreted By:  Cristian Arora, STUDY: CT ABDOMEN PELVIS W IV CONTRAST;  2/9/2024 10:14 am   INDICATION: Signs/Symptoms:Perforated appendicitis with abscess, further evaluation of the abscess.   COMPARISON: 02/08/2024.   ACCESSION NUMBER(S): [id number]   ORDERING CLINICIAN: JEFF SAMAYOA   TECHNIQUE: Contiguous axial images were obtained through the abdomen and pelvis after the administration of  75 mL Omnipaque 350 intravenous contrast. Coronal and sagittal reformations were made.   FINDINGS: LOWER CHEST: Bibasilar mild predominantly dependent atelectasis is present.   ABDOMEN:   LIVER: Small low-attenuation region of the left hepatic lobe anteriorly near the falciform ligament likely is due to focal fatty infiltration or transient perfusion variation.   BILE DUCTS: Nondilated.   GALLBLADDER: The gallbladder is not distended and without calcified stones.   PANCREAS: Within normal limits.   SPLEEN: Within normal limits.   ADRENAL GLANDS: Within normal limits.   KIDNEYS AND URETERS: The kidneys enhance symmetrically without focal lesion. There is some early contrast excretion in both renal collecting systems. No hydroureteronephrosis bilaterally.   Prostate is enlarged protruding into the base of the urinary bladder. Small dystrophic calcification present in the prostate.   VESSELS: Few small scattered atherosclerotic calcifications are seen in the aorta and branch vessels. No aortic aneurysm. IVC is unremarkable.   BOWEL/PERITONEUM: Changes of perforated appendicitis with associated abscess are more clearly defined with contrast. The right lower quadrant gas containing fluid collection shows peripheral enhancement measuring  approximately 5.8 x 3.5 x 6.5 cm in transverse, AP, and CC dimensions respectively. The base of the appendix appears mildly enlarged and thickened extending into this collection. The mid-distal aspect of the appendix extends into the collection and is poorly defined due to rupture. There is inflammatory fat stranding surrounding the abscess along with trace fluid in the right lower quadrant and mild wall thickening of the cecum.   No bowel obstruction. Mild mesenteric edema is present. No additional distant free intraperitoneal air.   Scattered small distal colonic diverticula are present without acute diverticulitis.   RETROPERITONEUM/LYMPH NODES: No retroperitoneal fluid collection or lymphadenopathy.   ABDOMINAL WALL: Tiny fat containing periumbilical hernia is present without inflammation.   BONE AND SOFT TISSUE: Mild multilevel disc space narrowing and endplate spurring of the visualized spine is greatest at L5-S1.       Findings compatible with ruptured appendicitis and abscess as detailed.   No bowel obstruction.   MACRO: Cristian Arora discussed the significance and urgency of this critical finding via secure chat with  Chiki Samuel on 2/9/2024 at 10:51 am.  (**-RCF-**) Findings:  See findings.   Signed by: Cristian Arora 2/9/2024 10:52 AM Dictation workstation:   [id number]     Assessment  Perforated acute appendicitis with abscess, IR placed drain 2/9/24    On exam ABD is soft, non distended and non tender. Pain elicited with palpation to RLQ over drain site. Bowel sounds present x 4 quadrant. Minimal purulent drainage from drain. Afebrile. Labs unremarkable this morning.     Plan  -Continue diet as tolerated.  -Pain control  -Nausea control  -DVT prophylaxis-lovenox  -Pulmonary toileting   -Encourage ambulation  -Patient getting midline  -Dipo- home today pending [facility] SOC.       Further recommendations per Dr. Jimmie Burns, KANG-CNP    Time spent  37  minutes obtaining labs, imaging,  recommendations, interview, assessment, examination, medication review/ordering, and EMR review.    Plan of care was discussed extensively with patient. Patient verbalized understanding through teach back method. All questions and concerns addressed upon examination.     Of note, this documentation is completed using the Dragon Dictation system (voice recognition software). There may be spelling and/or grammatical errors that were not corrected prior to final submission.

## 2024-02-13 NOTE — PROCEDURES
Pre-Procedure Checklist:  Emergent Line Insertion: No  Type of Line to be Placed: Midline  Consent Obtained: N/A  Emergency Medication Necessary: No  Patient Identified with 2 Independent Identifiers: Yes  Review of Allergies, Anticoagulation, Relevant Labs, ECG/Telemetry: Yes  Risks/Benefits/Alternatives Discussed with Patient/POA/Legal Representative: Yes  Stop Sign on Door: Yes  Time Out Performed: Yes  Catheter Exchange: No    Positioning Checklist:  All People, Including Patient, in the Room with Cap and Mask: Yes  Fluoroscopy Used to Identify Vessel and Guide Insertion: No   Sterile Cover Used: Yes  Full Barrier Precautions Followed (Mask, Cap, Gown, Gloves): Yes  Hands Washed: Yes  Monitors Attached with Sound Alarms On: No  Full Body Sterile Drape (Head-to-Toe) Used to Cover Patient: Yes  Trendelenburg Position (For IJ and Subclavian): No  CHG Skin Prep Used and Allowed to Air Dry to Skin Procedure: Yes    Procedure Checklist:  Blood Aspirated From All Lumens, All Ports Subsequently Flushed: Yes  Catheter Caps Placed on All Lumens; Lumens Clamped: Yes  Maintain Guidewire Control Throughout, Ensuring Guidewire Removal: Yes  Maintain Sterile Field Throughout Insertion: Yes  Catheter Secured: Yes  Confirmatory Test of Venous Placement: Non-Pulsatile Blood    Post Procedure Checklist:  Date and Time Written on Dressing: Yes  Sharp and Wire Count and Safe Disposal of all Sharps/Wires: Yes  Sterile Dressing Applied Per Protocol: Yes  X-ray Ordered or ECG Image: N/A    Midline Insertion Details:  Midline expires in 28 days date 3/12/2024  See LDA assessment for further midline details.  Additional Details: Line was inserted using Modified Seldinger's Technique.   Midline ready for immediate use.  Placed by: Honey Aguilar RN

## 2024-02-13 NOTE — HH CARE COORDINATION
Home Care received a Referral for Infusion and Nursing. We have processed the referral for a Start of Care on 2/14/24.     If you have any questions or concerns, please feel free to contact us at [phone]. Follow the prompts, enter your five digit zip code, and you will be directed to your care team on [facility].

## 2024-02-13 NOTE — CARE PLAN
Problem: Pain - Adult  Goal: Verbalizes/displays adequate comfort level or baseline comfort level  Outcome: Progressing     Problem: Safety - Adult  Goal: Free from fall injury  Outcome: Progressing     Problem: Discharge Planning  Goal: Discharge to home or other facility with appropriate resources  Outcome: Progressing     Problem: Chronic Conditions and Co-morbidities  Goal: Patient's chronic conditions and co-morbidity symptoms are monitored and maintained or improved  Outcome: Progressing     Problem: Pain  Goal: Walks with improved pain control throughout the shift  Outcome: Progressing  Goal: Performs ADL's with improved pain control throughout shift  Outcome: Progressing  Goal: Participates in PT with improved pain control throughout the shift  Outcome: Progressing  Goal: Free from opioid side effects throughout the shift  Outcome: Progressing  Goal: Free from acute confusion related to pain meds throughout the shift  Outcome: Progressing   The patient's goals for the shift include      The clinical goals for the shift include pain remains at a tolerable level for shift    Over the shift, the patient did not make progress toward the following goals. Barriers to progression include none. Recommendations to address these barriers include continued reinforcement of information

## 2024-02-13 NOTE — DISCHARGE SUMMARY
Discharge Diagnosis  Acute appendicitis with perforation, generalized peritonitis, and abscess, without gangrene    Issues Requiring Follow-Up  Please call the office to make an appointment to follow up with Dr. Samuel in 2 weeks.    Also call the office to make an appointment with infectious disease in 2-3 weeks.     Test Results Pending At Discharge  Pending Labs       No current pending labs.          Hospital Course  Patient is 63 year old male that presented to the ED due to abdominal pain, nausea, and constipation. CT abdomen and pelvis showed ruptured appendicitis and abscess. IR was consulted to place a percutaneous drain in which they did on 2/9/24. Drain has had purulent drainage. Patient is tolerating well. Patient will be discharged today in stable condition to home with home healthcare for IV infusions.     Pertinent Physical Exam At Time of Discharge  Physical Exam  See progress note.     Home Medications     Medication List      START taking these medications     ertapenem 1 gram injection; Commonly known as: INVanz; Infuse 1 g into a   venous catheter once daily.   oxyCODONE 5 mg immediate release tablet; Commonly known as: Roxicodone;   Take 1 tablet (5 mg) by mouth every 6 hours if needed for moderate pain (4   - 6) or severe pain (7 - 10).     CONTINUE taking these medications     ibuprofen 200 mg tablet   melatonin 5 mg tablet   Miralax 17 gram packet; Generic drug: polyethylene glycol     STOP taking these medications     azithromycin 250 mg tablet; Commonly known as: Zithromax       Outpatient Follow-Up  No future appointments.    Time spent  20  minutes obtaining labs, imaging, recommendations, interview, assessment, examination, medication review/ordering, and EMR review.    Plan of care was discussed extensively with patient. Patient verbalized understanding through teach back method. All questions and concerns addressed upon examination.     Of note, this documentation is completed using the  Dragon Dictation system (voice recognition software). There may be spelling and/or grammatical errors that were not corrected prior to final submission.     Gabby Burns, KANG-CNP

## 2024-02-13 NOTE — NURSING NOTE
Patient discharge instructions completed. Reviewed midline with patient, supplies and education given on CT guided drain. Meds to beds at bedside and dropped off medication. Pt has all personal items just awaiting son for transport.

## 2024-02-14 ENCOUNTER — HOME CARE VISIT (OUTPATIENT)
Dept: HOME HEALTH SERVICES | Facility: HOME HEALTH | Age: 64
End: 2024-02-14
Payer: COMMERCIAL

## 2024-02-14 ENCOUNTER — DOCUMENTATION (OUTPATIENT)
Dept: PRIMARY CARE | Facility: CLINIC | Age: 64
End: 2024-02-14
Payer: COMMERCIAL

## 2024-02-14 VITALS
OXYGEN SATURATION: 100 % | DIASTOLIC BLOOD PRESSURE: 74 MMHG | HEART RATE: 60 BPM | SYSTOLIC BLOOD PRESSURE: 123 MMHG | TEMPERATURE: 64.4 F | RESPIRATION RATE: 16 BRPM

## 2024-02-14 PROCEDURE — G0299 HHS/HOSPICE OF RN EA 15 MIN: HCPCS

## 2024-02-14 PROCEDURE — 0023 HH SOC

## 2024-02-14 ASSESSMENT — ENCOUNTER SYMPTOMS
PAIN: 1
HIGHEST PAIN SEVERITY IN PAST 24 HOURS: 2/10
PAIN LOCATION - PAIN QUALITY: ACHING
PERSON REPORTING PAIN: PATIENT
PAIN LOCATION: ABDOMEN
SUBJECTIVE PAIN PROGRESSION: UNCHANGED
PAIN LOCATION - PAIN SEVERITY: 2/10

## 2024-02-14 ASSESSMENT — ACTIVITIES OF DAILY LIVING (ADL)
ENTERING_EXITING_HOME: INDEPENDENT
OASIS_M1830: 01

## 2024-02-14 NOTE — PROGRESS NOTES
Discharge Facility: [facility]  Discharge Diagnosis: Acute appendicitis with perforation, generalized peritonitis, and abscess, without gangrene   Admission Date: 2/8/2024  Discharge Date: 2/13/2024    PCP Appointment Date: TBD-declined follow up at time of outreach  Specialist Appointment Date: TBD with GI/Surgeon  Hospital Encounter and Summary: Linked   See discharge assessment below for further details  Engagement  Call Start Time: 1030 (2/14/2024 12:55 PM)    Medications  Medications reviewed with patient/caregiver?: Yes (new meds discussed) (2/14/2024 12:55 PM)  Is the patient having any side effects they believe may be caused by any medication additions or changes?: No (2/14/2024 12:55 PM)  Does the patient have all medications ordered at discharge?: Yes (2/14/2024 12:55 PM)  Care Management Interventions: No intervention needed (2/14/2024 12:55 PM)  Prescription Comments: see med list (ertapenem; oxycodone) (2/14/2024 12:55 PM)  Is the patient taking all medications as directed (includes completed medication regime)?: Yes (2/14/2024 12:55 PM)  Care Management Interventions: Provided patient education (2/14/2024 12:55 PM)    Appointments  Does the patient have a primary care provider?: Yes (2/14/2024 12:55 PM)  Care Management Interventions: Educated patient on importance of making appointment; Advised patient to make appointment (2/14/2024 12:55 PM)  Has the patient kept scheduled appointments due by today?: Yes (2/14/2024 12:55 PM)  Care Management Interventions: Advised to schedule with specialist (2/14/2024 12:55 PM)    Self Management  What is the home health agency?: [facility] (2/14/2024 12:55 PM)  Has home health visited the patient within 72 hours of discharge?: Yes (2/14/2024 12:55 PM)    Patient Teaching  Does the patient have access to their discharge instructions?: Yes (2/14/2024 12:55 PM)  Care Management Interventions: Reviewed instructions with patient (2/14/2024 12:55 PM)  What is  "the patient's perception of their health status since discharge?: Improving (2/14/2024 12:55 PM)  Is the patient/caregiver able to teach back the hierarchy of who to call/visit for symptoms/problems? PCP, Specialist, Home Health nurse, Urgent Care, ED, 911: Yes (2/14/2024 12:55 PM)  Patient/Caregiver Education Comments: Patient states he is \"doing fine\" and states [facility] has been there to assist with IV antibiotics. States his pain is well controlled and plans to follow with specialty for diagnosis. (2/14/2024 12:55 PM)        "

## 2024-02-15 ENCOUNTER — HOME CARE VISIT (OUTPATIENT)
Dept: HOME HEALTH SERVICES | Facility: HOME HEALTH | Age: 64
End: 2024-02-15
Payer: COMMERCIAL

## 2024-02-15 VITALS
OXYGEN SATURATION: 99 % | TEMPERATURE: 99.1 F | RESPIRATION RATE: 20 BRPM | SYSTOLIC BLOOD PRESSURE: 144 MMHG | DIASTOLIC BLOOD PRESSURE: 89 MMHG | HEART RATE: 74 BPM

## 2024-02-15 PROCEDURE — G0299 HHS/HOSPICE OF RN EA 15 MIN: HCPCS

## 2024-02-15 RX ADMIN — ERTAPENEM 1 G: 1 INJECTION, POWDER, LYOPHILIZED, FOR SOLUTION INTRAMUSCULAR; INTRAVENOUS at 18:50

## 2024-02-15 RX ADMIN — Medication 10 ML: at 18:50

## 2024-02-15 ASSESSMENT — ENCOUNTER SYMPTOMS
DENIES PAIN: 1
PERSON REPORTING PAIN: PATIENT
APPETITE LEVEL: GOOD
CHANGE IN APPETITE: UNCHANGED

## 2024-02-17 ENCOUNTER — TELEPHONE (OUTPATIENT)
Dept: SURGERY | Facility: CLINIC | Age: 64
End: 2024-02-17
Payer: COMMERCIAL

## 2024-02-17 NOTE — TELEPHONE ENCOUNTER
Patient called about not being able to flush drain and decrease output; he will call the office and followup on Monday for a CT scan to check placement and size of abscess

## 2024-02-18 ENCOUNTER — APPOINTMENT (OUTPATIENT)
Dept: RADIOLOGY | Facility: HOSPITAL | Age: 64
End: 2024-02-18
Payer: COMMERCIAL

## 2024-02-18 ENCOUNTER — HOSPITAL ENCOUNTER (EMERGENCY)
Facility: HOSPITAL | Age: 64
Discharge: HOME | End: 2024-02-18
Attending: EMERGENCY MEDICINE
Payer: COMMERCIAL

## 2024-02-18 VITALS
HEIGHT: 71 IN | WEIGHT: 170.86 LBS | RESPIRATION RATE: 15 BRPM | DIASTOLIC BLOOD PRESSURE: 71 MMHG | OXYGEN SATURATION: 98 % | HEART RATE: 71 BPM | SYSTOLIC BLOOD PRESSURE: 120 MMHG | TEMPERATURE: 97.7 F | BODY MASS INDEX: 23.92 KG/M2

## 2024-02-18 DIAGNOSIS — R10.31 RIGHT LOWER QUADRANT ABDOMINAL PAIN: Primary | ICD-10-CM

## 2024-02-18 LAB
ALBUMIN SERPL BCP-MCNC: 3.7 G/DL (ref 3.4–5)
ALP SERPL-CCNC: 82 U/L (ref 33–136)
ALT SERPL W P-5'-P-CCNC: 59 U/L (ref 10–52)
ANION GAP SERPL CALC-SCNC: 10 MMOL/L (ref 10–20)
APPEARANCE UR: CLEAR
AST SERPL W P-5'-P-CCNC: 26 U/L (ref 9–39)
BASOPHILS # BLD AUTO: 0.06 X10*3/UL (ref 0–0.1)
BASOPHILS NFR BLD AUTO: 0.5 %
BILIRUB DIRECT SERPL-MCNC: 0.1 MG/DL (ref 0–0.3)
BILIRUB SERPL-MCNC: 0.5 MG/DL (ref 0–1.2)
BILIRUB UR STRIP.AUTO-MCNC: NEGATIVE MG/DL
BUN SERPL-MCNC: 14 MG/DL (ref 6–23)
CALCIUM SERPL-MCNC: 9.2 MG/DL (ref 8.6–10.3)
CHLORIDE SERPL-SCNC: 103 MMOL/L (ref 98–107)
CO2 SERPL-SCNC: 27 MMOL/L (ref 21–32)
COLOR UR: YELLOW
CREAT SERPL-MCNC: 0.91 MG/DL (ref 0.5–1.3)
EGFRCR SERPLBLD CKD-EPI 2021: >90 ML/MIN/1.73M*2
EOSINOPHIL # BLD AUTO: 0.08 X10*3/UL (ref 0–0.7)
EOSINOPHIL NFR BLD AUTO: 0.6 %
ERYTHROCYTE [DISTWIDTH] IN BLOOD BY AUTOMATED COUNT: 12.6 % (ref 11.5–14.5)
GLUCOSE SERPL-MCNC: 102 MG/DL (ref 74–99)
GLUCOSE UR STRIP.AUTO-MCNC: NEGATIVE MG/DL
HCT VFR BLD AUTO: 40.2 % (ref 41–52)
HGB BLD-MCNC: 13.8 G/DL (ref 13.5–17.5)
HOLD SPECIMEN: NORMAL
HOLD SPECIMEN: NORMAL
IMM GRANULOCYTES # BLD AUTO: 0.1 X10*3/UL (ref 0–0.7)
IMM GRANULOCYTES NFR BLD AUTO: 0.8 % (ref 0–0.9)
KETONES UR STRIP.AUTO-MCNC: NEGATIVE MG/DL
LACTATE SERPL-SCNC: 1.3 MMOL/L (ref 0.4–2)
LEUKOCYTE ESTERASE UR QL STRIP.AUTO: NEGATIVE
LYMPHOCYTES # BLD AUTO: 1.74 X10*3/UL (ref 1.2–4.8)
LYMPHOCYTES NFR BLD AUTO: 13.6 %
MCH RBC QN AUTO: 29.8 PG (ref 26–34)
MCHC RBC AUTO-ENTMCNC: 34.3 G/DL (ref 32–36)
MCV RBC AUTO: 87 FL (ref 80–100)
MONOCYTES # BLD AUTO: 0.8 X10*3/UL (ref 0.1–1)
MONOCYTES NFR BLD AUTO: 6.3 %
NEUTROPHILS # BLD AUTO: 9.97 X10*3/UL (ref 1.2–7.7)
NEUTROPHILS NFR BLD AUTO: 78.2 %
NITRITE UR QL STRIP.AUTO: NEGATIVE
NRBC BLD-RTO: 0 /100 WBCS (ref 0–0)
PH UR STRIP.AUTO: 6 [PH]
PLATELET # BLD AUTO: 514 X10*3/UL (ref 150–450)
POTASSIUM SERPL-SCNC: 3.8 MMOL/L (ref 3.5–5.3)
PROT SERPL-MCNC: 6.8 G/DL (ref 6.4–8.2)
PROT UR STRIP.AUTO-MCNC: NEGATIVE MG/DL
RBC # BLD AUTO: 4.63 X10*6/UL (ref 4.5–5.9)
RBC # UR STRIP.AUTO: NEGATIVE /UL
SODIUM SERPL-SCNC: 136 MMOL/L (ref 136–145)
SP GR UR STRIP.AUTO: 1
UROBILINOGEN UR STRIP.AUTO-MCNC: <2 MG/DL
WBC # BLD AUTO: 12.8 X10*3/UL (ref 4.4–11.3)

## 2024-02-18 PROCEDURE — 83605 ASSAY OF LACTIC ACID: CPT | Performed by: NURSE PRACTITIONER

## 2024-02-18 PROCEDURE — 71045 X-RAY EXAM CHEST 1 VIEW: CPT

## 2024-02-18 PROCEDURE — 81003 URINALYSIS AUTO W/O SCOPE: CPT | Performed by: NURSE PRACTITIONER

## 2024-02-18 PROCEDURE — 2550000001 HC RX 255 CONTRASTS: Performed by: NURSE PRACTITIONER

## 2024-02-18 PROCEDURE — 2500000004 HC RX 250 GENERAL PHARMACY W/ HCPCS (ALT 636 FOR OP/ED): Performed by: NURSE PRACTITIONER

## 2024-02-18 PROCEDURE — 96360 HYDRATION IV INFUSION INIT: CPT | Performed by: EMERGENCY MEDICINE

## 2024-02-18 PROCEDURE — 71045 X-RAY EXAM CHEST 1 VIEW: CPT | Mod: FOREIGN READ | Performed by: RADIOLOGY

## 2024-02-18 PROCEDURE — 74177 CT ABD & PELVIS W/CONTRAST: CPT | Mod: FOREIGN READ | Performed by: RADIOLOGY

## 2024-02-18 PROCEDURE — 85025 COMPLETE CBC W/AUTO DIFF WBC: CPT | Performed by: NURSE PRACTITIONER

## 2024-02-18 PROCEDURE — 99284 EMERGENCY DEPT VISIT MOD MDM: CPT | Mod: 25 | Performed by: EMERGENCY MEDICINE

## 2024-02-18 PROCEDURE — 74177 CT ABD & PELVIS W/CONTRAST: CPT

## 2024-02-18 PROCEDURE — 80076 HEPATIC FUNCTION PANEL: CPT | Performed by: NURSE PRACTITIONER

## 2024-02-18 RX ORDER — HYDROCODONE BITARTRATE AND ACETAMINOPHEN 5; 325 MG/1; MG/1
1 TABLET ORAL EVERY 6 HOURS PRN
Qty: 12 TABLET | Refills: 0 | Status: SHIPPED | OUTPATIENT
Start: 2024-02-18 | End: 2024-02-21

## 2024-02-18 RX ADMIN — IOHEXOL 75 ML: 350 INJECTION, SOLUTION INTRAVENOUS at 14:46

## 2024-02-18 RX ADMIN — SODIUM CHLORIDE 500 ML: 9 INJECTION, SOLUTION INTRAVENOUS at 10:40

## 2024-02-18 ASSESSMENT — COLUMBIA-SUICIDE SEVERITY RATING SCALE - C-SSRS
1. IN THE PAST MONTH, HAVE YOU WISHED YOU WERE DEAD OR WISHED YOU COULD GO TO SLEEP AND NOT WAKE UP?: NO
6. HAVE YOU EVER DONE ANYTHING, STARTED TO DO ANYTHING, OR PREPARED TO DO ANYTHING TO END YOUR LIFE?: NO
2. HAVE YOU ACTUALLY HAD ANY THOUGHTS OF KILLING YOURSELF?: NO

## 2024-02-18 ASSESSMENT — LIFESTYLE VARIABLES
EVER FELT BAD OR GUILTY ABOUT YOUR DRINKING: NO
HAVE YOU EVER FELT YOU SHOULD CUT DOWN ON YOUR DRINKING: NO
EVER HAD A DRINK FIRST THING IN THE MORNING TO STEADY YOUR NERVES TO GET RID OF A HANGOVER: NO
HAVE PEOPLE ANNOYED YOU BY CRITICIZING YOUR DRINKING: NO

## 2024-02-18 ASSESSMENT — PAIN - FUNCTIONAL ASSESSMENT: PAIN_FUNCTIONAL_ASSESSMENT: 0-10

## 2024-02-18 ASSESSMENT — PAIN SCALES - GENERAL: PAINLEVEL_OUTOF10: 3

## 2024-02-18 NOTE — ED PROVIDER NOTES
"HPI   Chief Complaint   Patient presents with    Abdominal Pain     \"Here for stomach pain that has been going on for over a week.  Having nausea.\"    Post-op Problem     \"Had a ruptured appendix used local anesthnia to suck out the abscess and put catheter\"       63-year-old male presents emergency department, was admitted Friday after he was found to have a ruptured appendix.  States he was discharged home with a PICC line, has been giving himself IV antibiotics.  Patient states noticed decreased amount of output into the drain that was placed.  Contacted the surgeon who recommended he present to the ED.  Patient states he feels very off today, mildly confused and off balance, chilled.      History provided by:  Patient   used: No                        No data recorded                     Patient History   Past Medical History:   Diagnosis Date    Personal history of other diseases of the nervous system and sense organs     History of Bell's palsy    Ruptured appendix      Past Surgical History:   Procedure Laterality Date    OTHER SURGICAL HISTORY  03/30/2021    Microlaminectomy     No family history on file.  Social History     Tobacco Use    Smoking status: Never    Smokeless tobacco: Never   Vaping Use    Vaping Use: Never used   Substance Use Topics    Alcohol use: Not Currently     Comment: Drinks monthly or less    Drug use: Never       Physical Exam   ED Triage Vitals [02/18/24 1024]   Temperature Heart Rate Respirations BP   36.5 °C (97.7 °F) 83 18 115/70      Pulse Ox Temp Source Heart Rate Source Patient Position   97 % Temporal Monitor Sitting      BP Location FiO2 (%)     Right arm --       Physical Exam  Physical Exam:  Constitutional: Vitals noted, no distress. Afebrile.   Cardiovascular: Regular, rate, rhythm, no murmur.   Pulmonary: Lungs clear bilaterally with good aeration. No adventitious breath sounds.   Gastrointestinal: Soft, nonsurgical.. No peritoneal signs. " Normoactive bowel sounds.  Drain noted right lower quadrant, the tip of the drain is noted external  Musculoskeletal: No peripheral edema. Negative Homans bilaterally, no cords.   Skin: No rash.   Neuro: No focal neurologic deficits, NIH score of 0.    ED Course & MDM   Diagnoses as of 03/02/24 1502   Right lower quadrant abdominal pain     Labs Reviewed   CBC WITH AUTO DIFFERENTIAL - Abnormal       Result Value    WBC 12.8 (*)     nRBC 0.0      RBC 4.63      Hemoglobin 13.8      Hematocrit 40.2 (*)     MCV 87      MCH 29.8      MCHC 34.3      RDW 12.6      Platelets 514 (*)     Neutrophils % 78.2      Immature Granulocytes %, Automated 0.8      Lymphocytes % 13.6      Monocytes % 6.3      Eosinophils % 0.6      Basophils % 0.5      Neutrophils Absolute 9.97 (*)     Immature Granulocytes Absolute, Automated 0.10      Lymphocytes Absolute 1.74      Monocytes Absolute 0.80      Eosinophils Absolute 0.08      Basophils Absolute 0.06     BASIC METABOLIC PANEL - Abnormal    Glucose 102 (*)     Sodium 136      Potassium 3.8      Chloride 103      Bicarbonate 27      Anion Gap 10      Urea Nitrogen 14      Creatinine 0.91      eGFR >90      Calcium 9.2     HEPATIC FUNCTION PANEL - Abnormal    Albumin 3.7      Bilirubin, Total 0.5      Bilirubin, Direct 0.1      Alkaline Phosphatase 82      ALT 59 (*)     AST 26      Total Protein 6.8     LACTATE - Normal    Lactate 1.3      Narrative:     Venipuncture immediately after or during the administration of Metamizole may lead to falsely low results. Testing should be performed immediately  prior to Metamizole dosing.   URINALYSIS WITH REFLEX CULTURE AND MICROSCOPIC - Normal    Color, Urine Yellow      Appearance, Urine Clear      Specific Gravity, Urine 1.005      pH, Urine 6.0      Protein, Urine NEGATIVE      Glucose, Urine NEGATIVE      Blood, Urine NEGATIVE      Ketones, Urine NEGATIVE      Bilirubin, Urine NEGATIVE      Urobilinogen, Urine <2.0      Nitrite, Urine NEGATIVE       Leukocyte Esterase, Urine NEGATIVE     URINALYSIS WITH REFLEX CULTURE AND MICROSCOPIC    Narrative:     The following orders were created for panel order Urinalysis with Reflex Culture and Microscopic.  Procedure                               Abnormality         Status                     ---------                               -----------         ------                     Urinalysis with Reflex C...[[id number]]  Normal              Final result               Extra Urine Gray Tube[[id number]]                            Final result                 Please view results for these tests on the individual orders.   EXTRA URINE GRAY TUBE    Extra Tube Hold for add-ons.          CT abdomen pelvis w IV contrast   Final Result   Interval evacuation of the right lower quadrant abscess. Residual 12   mm hypodensity within the surgical site which may represent postop   seroma or minimal residual abscess. Mild postop ileus.   Signed by Akash Viera MD      XR chest 1 view   Final Result   Mid line catheter is not visible on the current study. Additional   imaging of the upper extremity and/or repeat chest radiograph after   repositioning the catheter would be recommended.   Possible narrowing of the trachea and left mainstem bronchus. As   clinical symptoms warrant and in the proper clinical timeframe, CT of   the chest may be considered.   Signed by Dayna Sanchez MD           Medical Decision Making  I offered patient pain medication nausea medication, it indicated that he just taken Tylenol prior to arrival.    Was given 500 cc IV fluid bolus,    Laboratory workup obtained, white cell count elevated to 12.8, otherwise labs unremarkable.    Sent for CT imaging of the abdomen/pelvis    Awaiting CT imaging results, will be signed out to MIRANDA Mandel for disposition.    Procedure  Procedures     WIL Rivero  02/18/24 1527       KANG Rivero-CNP  03/02/24 1505

## 2024-02-18 NOTE — PROGRESS NOTES
Emergency Medicine Transition of Care Note.    I received Tacho Conway in signout from Raquel Vásquez CNP.  Please see the previous ED provider note for all HPI, PE and MDM up to the time of signout at 4 PM. This is in addition to the primary record.    In brief Tacho Conway is an 63 y.o. male presenting for abdominal pain and wound check.  Patient recently had a ruptured appendix that required a drain.  Patient came in today because he was having pain.  And he accidentally pulled the drain out.  States that his pain has been controlled with ibuprofen and Tylenol and he has no nausea and his appetite is starting to improve as well.    Patient's CBC shows a leukocytosis 12.8 with a left shift neutrophil count.  This is mildly elevated compared to the patient was previous however he does have a PICC like in and he is getting IV antibiotics.       ALT is elevated to 59 but BMP is unremarkable.  ALT is 59 however BMP is unremarkable.  ALT elevation is 2.  Lactate is within normal limits.  Urinalysis reveals no acute abnormalities.     I CT shows Interval evacuation of the right lower quadrant abscess. 12  mm hypodensity within the surgical site which may represent postop  seroma or minimal residual abscess. Mild postop ileus.    I spoke to Dr. Samuel who reviewed the patient's imaging himself and is very reassured on how well the area appears to be healing.  Does not believe the patient requires any additional intervention at this time.  Recommended the patient call his office tomorrow to set up an appointment for 1 week.  Patient is agreeable with this plan and would like to go home.  He is requesting something additional for his breakthrough pain so I will give him a few doses of Norco however recommend that he take ibuprofen and Tylenol primarily.  He declined needing any nausea medicine.  I did discuss observation and to complaining of fluids for the ileus.  All questions and concerns addressed.  Reasons to return to  ER discussed.  Patient verbalized understanding and agreement with the treatment plan and they remained hemodynamically stable in the ER    Medical Decision Making      Final diagnoses:   None           Procedure  Procedures    Shashank Laguna PA-C

## 2024-02-21 ENCOUNTER — HOME CARE VISIT (OUTPATIENT)
Dept: HOME HEALTH SERVICES | Facility: HOME HEALTH | Age: 64
End: 2024-02-21
Payer: COMMERCIAL

## 2024-02-21 ENCOUNTER — HOME INFUSION (OUTPATIENT)
Dept: INFUSION THERAPY | Age: 64
End: 2024-02-21

## 2024-02-21 VITALS
OXYGEN SATURATION: 98 % | TEMPERATURE: 97.2 F | HEART RATE: 72 BPM | SYSTOLIC BLOOD PRESSURE: 118 MMHG | DIASTOLIC BLOOD PRESSURE: 60 MMHG | RESPIRATION RATE: 18 BRPM

## 2024-02-21 PROCEDURE — G0299 HHS/HOSPICE OF RN EA 15 MIN: HCPCS

## 2024-02-21 PROCEDURE — G0180 MD CERTIFICATION HHA PATIENT: HCPCS | Performed by: SURGERY

## 2024-02-21 ASSESSMENT — PAIN SCALES - PAIN ASSESSMENT IN ADVANCED DEMENTIA (PAINAD)
NEGVOCALIZATION: 0 - NONE.
BODYLANGUAGE: 0 - RELAXED.
FACIALEXPRESSION: 0 - SMILING OR INEXPRESSIVE.
BREATHING: 0
BODYLANGUAGE: 0
FACIALEXPRESSION: 0
NEGVOCALIZATION: 0

## 2024-02-21 ASSESSMENT — ENCOUNTER SYMPTOMS
DENIES PAIN: 1
CHANGE IN APPETITE: UNCHANGED
LOSS OF SENSATION IN FEET: 0
BOWEL PATTERN NORMAL: 1
PERSON REPORTING PAIN: PATIENT
DEPRESSION: 0
OCCASIONAL FEELINGS OF UNSTEADINESS: 0
STOOL FREQUENCY: DAILY
LAST BOWEL MOVEMENT: 66891
APPETITE LEVEL: GOOD

## 2024-02-21 NOTE — HOME HEALTH
FOLLOW RN INFUSION VISIT FOR WEEKLY MIDLINE DRESSING CHANGE. VITAL SIGNS STABLE. PATIENT REPORTS HE WENT TO ER ON 2/18/24 SUNDAY. FOR DIZZINESS AND ABDOMINAL PAIN. LABS DRAWN AT HOSPITAL AND ON PATIENT CHART. PATIENT DECLINES LABS TODAY. VERIFIED THAT LABS NEEDED TODAY HAD BEEN COLLECTED ON 2/18/24. PATIENT HAS FOLLOW UP WITH DR CHIN ON MONDAY. DENIES ANY PAIN TODAY. DENIES ANY OTHER CONCERNS. REPORTS INDEPENDENT WITH IV ATB INFUSIONS. REVIEWED MEDICATIONS WITH PATIENT AND POC, PATIENT STATES UNDERSTANDING.    PLAN FOR NEXT VISIT: CP ASSESSMENT/ASSESS FOR FINAL INFUSION ORDERS AND NEED FOR MIDLINE PULL.   IF FINAL ORDERS OBTAINED WILL PULL LINE AND DISCHARGE FROM [facility].

## 2024-02-21 NOTE — Clinical Note
Patient went to ER on Sunday 2/18/24 with feeling dizzy and abdominal pain. Labs obtained for this week at ER on 2/18/24. Patient declined lab draw in home this morning. Patient also reports that hospital did a follow up CT scan.

## 2024-02-21 NOTE — PROGRESS NOTES
Tacho Conway is receiving IV ertapenem 1g q24 for perforated appendix through 3/13/24. Followed by Dr. Samuel (gen surg)  Labs ordered include CRP, CMP, CBC    Labs from 2/18 reviewed, WBC 12.8 but otherwise unremarkable.   Line - pt has midline placed 2/13/24    [facility] left ms for pt notifying of delivery Friday afternoon or evening and to call pharmacy # if any concerns or issues.    Dispensing 2/22 with supplies and flushes to match for OVN delivery:  7x ertapenem 1g in 100mL NS MB+  DOS 2/24-3/1    Follow up 2/29 check labs, progress, OVN delveriy

## 2024-02-26 ENCOUNTER — APPOINTMENT (OUTPATIENT)
Dept: SURGERY | Facility: CLINIC | Age: 64
End: 2024-02-26
Payer: COMMERCIAL

## 2024-02-28 ENCOUNTER — HOME CARE VISIT (OUTPATIENT)
Dept: HOME HEALTH SERVICES | Facility: HOME HEALTH | Age: 64
End: 2024-02-28
Payer: COMMERCIAL

## 2024-02-28 PROCEDURE — G0299 HHS/HOSPICE OF RN EA 15 MIN: HCPCS

## 2024-02-29 ENCOUNTER — DOCUMENTATION (OUTPATIENT)
Dept: INFUSION THERAPY | Age: 64
End: 2024-02-29

## 2024-02-29 ENCOUNTER — HOME INFUSION (OUTPATIENT)
Dept: INFUSION THERAPY | Age: 64
End: 2024-02-29

## 2024-02-29 ENCOUNTER — OFFICE VISIT (OUTPATIENT)
Dept: SURGERY | Facility: CLINIC | Age: 64
End: 2024-02-29
Payer: COMMERCIAL

## 2024-02-29 VITALS — BODY MASS INDEX: 24.78 KG/M2 | HEIGHT: 71 IN | WEIGHT: 177 LBS

## 2024-02-29 DIAGNOSIS — K35.211 ACUTE APPENDICITIS WITH PERFORATION, GENERALIZED PERITONITIS, AND ABSCESS, WITHOUT GANGRENE: Primary | ICD-10-CM

## 2024-02-29 PROCEDURE — 1036F TOBACCO NON-USER: CPT | Performed by: SURGERY

## 2024-02-29 PROCEDURE — 99213 OFFICE O/P EST LOW 20 MIN: CPT | Performed by: SURGERY

## 2024-02-29 NOTE — PROGRESS NOTES
Subjective   Patient ID: Tacho Conway is a 63 y.o. male who presents for Hospital Follow-up (Here for hospital follow up and drain removal ).  HPI  63-year-old patient history of perforated acute appendicitis with abscess requiring drain placement on February 9. He has been on Invanz. He went to the ER on February 18 because the catheter was not flushing.  CT abd/pelvis scan shows 12 mm residual hypodense fluid collection.  Patient has been doing well.  Denies nausea, vomiting, abdominal pain and fevers. He is tolerating regular diet. He reports night sweats since starting the abx.    Objective   Physical Exam  Constitutional: no acute distress, well appearing and well nourished  Pulmonary: normal respiratory effort; clear to auscultation bilaterally, no wheezes or bronchi   Cardiovascular: regular rate and rhythm, no murmurs or extra-heart sounds; pedal pulses are normal; no extremities edema or varicosities, no peripheral edema  Abdomen: soft, non-tender, tip of catheter already out  Musculoskeletal: digits and nails normal without clubbing or cyanosis; Joints, bones and muscles are normal with normal range of motion; muscle strength/tone is normal  Assessment/Plan   63-year-old patient history of perforated appendicitis with abscess requiring drain placement on 2/9/2024.  I reviewed the Last CT scan on February 18 showing almost resolved abscess with 12 mm residual collection. Patient is doing well.  The drain is already out and hole has closed. He has another 2 wks of Invanz and will followup after that.  He has never had a screening colonoscopy.  I told him in 2 to 3 months. he will first need a colonoscopy followed by interval appendectomy. All questions answered       Chiki Samuel MD 02/29/24 2:46 PM

## 2024-02-29 NOTE — PROGRESS NOTES
CALLED PT AND LEFT VM - DELIVERY OF REMAINING 12 DAYS ABT THERAPY WITH MATCHING SUPPLIES IS SCHEDULED FOR TOMORROW...UNABLE TO VERIFY SUPPLIES INVENTORY SENDING ALL STND..PATIENT ASKED TO CALL PHARMACY WITH ANY QUESTIONS OR CHANGES...

## 2024-02-29 NOTE — PROGRESS NOTES
Tacho Conway is receiving IV ertapenem 1g q24 for perforated appendix through 3/13/24. Followed by Dr. Samuel (gen surg)  Labs ordered include CRP, CMP, CBC     No new labs to review  Line - pt has midline placed 2/13/24     Rph call to patient tolerating infusions well, confirmed supply in home thru 3/1. Agreeable to delivery of all remaining doses thru 3/13.      Pharmacy to mix 2/29 and deliver 3/1 with supplies and flushes to match:  12x ertapenem 1g in 100mL NS MB+  DOS 3/2 - 3/13     Follow up 3/13 plan of care Dr Samuel

## 2024-03-03 VITALS
DIASTOLIC BLOOD PRESSURE: 62 MMHG | HEART RATE: 68 BPM | SYSTOLIC BLOOD PRESSURE: 122 MMHG | TEMPERATURE: 97.9 F | RESPIRATION RATE: 16 BRPM

## 2024-03-03 ASSESSMENT — ENCOUNTER SYMPTOMS
DENIES PAIN: 1
PERSON REPORTING PAIN: PATIENT

## 2024-03-04 ASSESSMENT — ENCOUNTER SYMPTOMS
CHANGE IN APPETITE: UNCHANGED
MUSCLE WEAKNESS: 1

## 2024-03-07 ENCOUNTER — LAB (OUTPATIENT)
Dept: LAB | Facility: LAB | Age: 64
End: 2024-03-07
Payer: COMMERCIAL

## 2024-03-07 ENCOUNTER — HOME CARE VISIT (OUTPATIENT)
Dept: HOME HEALTH SERVICES | Facility: HOME HEALTH | Age: 64
End: 2024-03-07
Payer: COMMERCIAL

## 2024-03-07 VITALS
SYSTOLIC BLOOD PRESSURE: 100 MMHG | HEART RATE: 52 BPM | RESPIRATION RATE: 16 BRPM | TEMPERATURE: 98.5 F | DIASTOLIC BLOOD PRESSURE: 60 MMHG

## 2024-03-07 DIAGNOSIS — K35.211 ACUTE APPENDICITIS WITH PERFORATION, GENERALIZED PERITONITIS, AND ABSCESS, WITHOUT GANGRENE: ICD-10-CM

## 2024-03-07 LAB
ALBUMIN SERPL BCP-MCNC: 4 G/DL (ref 3.4–5)
ALP SERPL-CCNC: 83 U/L (ref 33–136)
ALT SERPL W P-5'-P-CCNC: 55 U/L (ref 10–52)
ANION GAP SERPL CALC-SCNC: 11 MMOL/L (ref 10–20)
AST SERPL W P-5'-P-CCNC: 28 U/L (ref 9–39)
BILIRUB SERPL-MCNC: 0.6 MG/DL (ref 0–1.2)
BUN SERPL-MCNC: 13 MG/DL (ref 6–23)
CALCIUM SERPL-MCNC: 9.2 MG/DL (ref 8.6–10.3)
CHLORIDE SERPL-SCNC: 103 MMOL/L (ref 98–107)
CO2 SERPL-SCNC: 28 MMOL/L (ref 21–32)
CREAT SERPL-MCNC: 0.98 MG/DL (ref 0.5–1.3)
CRP SERPL-MCNC: <0.1 MG/DL
EGFRCR SERPLBLD CKD-EPI 2021: 86 ML/MIN/1.73M*2
ERYTHROCYTE [DISTWIDTH] IN BLOOD BY AUTOMATED COUNT: 13.1 % (ref 11.5–14.5)
GLUCOSE SERPL-MCNC: 106 MG/DL (ref 74–99)
HCT VFR BLD AUTO: 42.3 % (ref 41–52)
HGB BLD-MCNC: 14.1 G/DL (ref 13.5–17.5)
MCH RBC QN AUTO: 29.7 PG (ref 26–34)
MCHC RBC AUTO-ENTMCNC: 33.3 G/DL (ref 32–36)
MCV RBC AUTO: 89 FL (ref 80–100)
NRBC BLD-RTO: 0 /100 WBCS (ref 0–0)
PLATELET # BLD AUTO: 220 X10*3/UL (ref 150–450)
POTASSIUM SERPL-SCNC: 4.5 MMOL/L (ref 3.5–5.3)
PROT SERPL-MCNC: 6.4 G/DL (ref 6.4–8.2)
RBC # BLD AUTO: 4.75 X10*6/UL (ref 4.5–5.9)
SODIUM SERPL-SCNC: 137 MMOL/L (ref 136–145)
WBC # BLD AUTO: 6.1 X10*3/UL (ref 4.4–11.3)

## 2024-03-07 PROCEDURE — G0299 HHS/HOSPICE OF RN EA 15 MIN: HCPCS

## 2024-03-07 PROCEDURE — 85027 COMPLETE CBC AUTOMATED: CPT

## 2024-03-07 PROCEDURE — 80053 COMPREHEN METABOLIC PANEL: CPT

## 2024-03-07 PROCEDURE — 86140 C-REACTIVE PROTEIN: CPT

## 2024-03-07 ASSESSMENT — ENCOUNTER SYMPTOMS
CHANGE IN APPETITE: UNCHANGED
APPETITE LEVEL: GOOD
DENIES PAIN: 1

## 2024-03-13 ENCOUNTER — HOME INFUSION (OUTPATIENT)
Dept: INFUSION THERAPY | Age: 64
End: 2024-03-13
Payer: COMMERCIAL

## 2024-03-13 DIAGNOSIS — K35.211 ACUTE APPENDICITIS WITH PERFORATION, GENERALIZED PERITONITIS, AND ABSCESS, WITHOUT GANGRENE: Primary | ICD-10-CM

## 2024-03-13 NOTE — PROGRESS NOTES
Reivew of chart. Patient with order for ertapenem thru today only for perforated appendix. Weekly labs are ordered with results to dr Kinney.    Tel call with guerline at dr Kinney office. Patient needs to be seen before midline can be removed. [facility] after last dose of ertapenem today.    Tel call with patient. Explained that MD wants to see him before deciding on line pull. He verbalized understanding and will flush line daily with 1 x NS and 1 x heparin flush syringe until removed. He thinks he has approx 5 days of flushes left in the home. Patient to call MD office this afternoon when he gets home to get appt to be seen.    Follow up 3/15/24 to check if seen/appt made yet. If not seen yet/midline still in place, check if flush delivery is  needed before the weekend.

## 2024-03-14 ENCOUNTER — HOME INFUSION (OUTPATIENT)
Dept: INFUSION THERAPY | Age: 64
End: 2024-03-14
Payer: COMMERCIAL

## 2024-03-14 ENCOUNTER — TELEMEDICINE (OUTPATIENT)
Dept: INFECTIOUS DISEASES | Age: 64
End: 2024-03-14

## 2024-03-14 ENCOUNTER — HOME CARE VISIT (OUTPATIENT)
Dept: HOME HEALTH SERVICES | Facility: HOME HEALTH | Age: 64
End: 2024-03-14
Payer: COMMERCIAL

## 2024-03-14 VITALS
HEART RATE: 67 BPM | OXYGEN SATURATION: 98 % | TEMPERATURE: 97.3 F | SYSTOLIC BLOOD PRESSURE: 118 MMHG | DIASTOLIC BLOOD PRESSURE: 60 MMHG | RESPIRATION RATE: 18 BRPM

## 2024-03-14 DIAGNOSIS — K35.32 PERFORATED APPENDICITIS: Primary | ICD-10-CM

## 2024-03-14 DIAGNOSIS — K65.1 INTRA-ABDOMINAL ABSCESS (HCC): ICD-10-CM

## 2024-03-14 DIAGNOSIS — A49.9 POLYMICROBIAL BACTERIAL INFECTION: ICD-10-CM

## 2024-03-14 DIAGNOSIS — K35.211 ACUTE APPENDICITIS WITH PERFORATION, GENERALIZED PERITONITIS, AND ABSCESS, WITHOUT GANGRENE: Primary | ICD-10-CM

## 2024-03-14 PROCEDURE — G0299 HHS/HOSPICE OF RN EA 15 MIN: HCPCS

## 2024-03-14 ASSESSMENT — ACTIVITIES OF DAILY LIVING (ADL)
OASIS_M1830: 00
HOME_HEALTH_OASIS: 00

## 2024-03-14 ASSESSMENT — ENCOUNTER SYMPTOMS: DENIES PAIN: 1

## 2024-03-14 NOTE — PROGRESS NOTES
Patient seen today by [facility]   Midline to be pulled by Homecare RN, RN aware  Order entered into EPIC    Discharge from Infusion Pharmacy service

## 2024-03-14 NOTE — PROGRESS NOTES
patient care  Addressed preventive measures such as vaccinations, proper hand hygiene, the importance of annual exam with the PCP, and the importance of health maintenance by dietary and exercise regimens.     All questions were answered from an ID perspective and to the best of my ability.      Risks and benefits of ID prescribed medications were discussed with patient or supporting staff caring for the patient as appropriate and feedback obtained.     Dorota Campos, DO

## 2024-03-18 ENCOUNTER — OFFICE VISIT (OUTPATIENT)
Dept: SURGERY | Facility: CLINIC | Age: 64
End: 2024-03-18
Payer: COMMERCIAL

## 2024-03-18 VITALS — HEIGHT: 71 IN | BODY MASS INDEX: 24.36 KG/M2 | WEIGHT: 174 LBS

## 2024-03-18 DIAGNOSIS — K35.211 ACUTE APPENDICITIS WITH PERFORATION, GENERALIZED PERITONITIS, AND ABSCESS, WITHOUT GANGRENE: Primary | ICD-10-CM

## 2024-03-18 DIAGNOSIS — Z12.11 ENCOUNTER FOR SCREENING COLONOSCOPY: ICD-10-CM

## 2024-03-18 PROCEDURE — 99214 OFFICE O/P EST MOD 30 MIN: CPT | Performed by: SURGERY

## 2024-03-18 PROCEDURE — 1036F TOBACCO NON-USER: CPT | Performed by: SURGERY

## 2024-03-18 NOTE — PROGRESS NOTES
Subjective   Patient ID: Tacho Conway is a 63 y.o. male who presents for No chief complaint on file..  HPI  63-year-old male patient history of perforated acute appendicitis with abscess requiring drain placement on February 9. The drain fell out on February 18.  CT abd/pelvis scan shows 12 mm residual hypodense fluid collection. I last saw him on February 29; denies nausea, vomiting, abdominal pain and fevers. He is tolerating regular diet.  He has completed the Invanz.      Objective   Physical Exam  Constitutional: no acute distress, well appearing and well nourished  Pulmonary: normal respiratory effort; clear to auscultation bilaterally, no wheezes or bronchi   Cardiovascular: regular rate and rhythm, no murmurs or extra-heart sounds; pedal pulses are normal; no extremities edema or varicosities, no peripheral edema  Abdomen: soft, non-tender, non-distended; no organomegaly; no peritoneal sign, no hernia  Musculoskeletal: digits and nails normal without clubbing or cyanosis; Joints, bones and muscles are normal with normal range of motion; muscle strength/tone is normal  Skin: normal without rashes or lesion  Neurologic: cranial nerve II-XII intact grossly; normal gait  Psychiatric: oriented to person, place and time  Assessment/Plan   63-year-old male patient with history of perforated acute appendicitis with abscess requiring drain placement on February 9.  Drain fell out on 18 with CT scan showing 12 mm residual abscess. He is doing well and has completed 4 wks of Invanz.  Because he has never had a screening colonoscopy, he will undergo one prior to the appendectomy. He is consented for colonoscopy, possible polypectomy, possible biopsy and laparoscopic appendectomy, possible open, possible bowel resection, possible open.  I explained to him the procedure, the risks and complications which include but not limited to bleeding, infection and bowel injury.       Chiki Samuel MD 03/18/24 9:13 AM

## 2024-04-22 RX ORDER — TIZANIDINE 4 MG/1
1 TABLET ORAL 3 TIMES DAILY PRN
COMMUNITY
Start: 2022-10-14

## 2024-04-22 RX ORDER — GABAPENTIN 300 MG/1
300 CAPSULE ORAL AS NEEDED
COMMUNITY

## 2024-04-22 RX ORDER — CHOLECALCIFEROL (VITAMIN D3) 50 MCG
2000 TABLET ORAL DAILY
COMMUNITY

## 2024-04-23 ENCOUNTER — ANESTHESIA EVENT (OUTPATIENT)
Dept: GASTROENTEROLOGY | Facility: HOSPITAL | Age: 64
End: 2024-04-23
Payer: COMMERCIAL

## 2024-04-23 RX ORDER — SODIUM CHLORIDE, SODIUM LACTATE, POTASSIUM CHLORIDE, CALCIUM CHLORIDE 600; 310; 30; 20 MG/100ML; MG/100ML; MG/100ML; MG/100ML
100 INJECTION, SOLUTION INTRAVENOUS CONTINUOUS
Status: CANCELLED | OUTPATIENT
Start: 2024-04-23

## 2024-04-24 ENCOUNTER — ANESTHESIA (OUTPATIENT)
Dept: GASTROENTEROLOGY | Facility: HOSPITAL | Age: 64
End: 2024-04-24
Payer: COMMERCIAL

## 2024-04-24 ENCOUNTER — HOSPITAL ENCOUNTER (OUTPATIENT)
Dept: GASTROENTEROLOGY | Facility: HOSPITAL | Age: 64
Discharge: HOME | End: 2024-04-24
Payer: COMMERCIAL

## 2024-04-24 VITALS
BODY MASS INDEX: 22.75 KG/M2 | TEMPERATURE: 97.3 F | HEART RATE: 57 BPM | RESPIRATION RATE: 18 BRPM | DIASTOLIC BLOOD PRESSURE: 75 MMHG | WEIGHT: 162.48 LBS | SYSTOLIC BLOOD PRESSURE: 117 MMHG | OXYGEN SATURATION: 100 % | HEIGHT: 71 IN

## 2024-04-24 DIAGNOSIS — Z12.11 ENCOUNTER FOR SCREENING COLONOSCOPY: ICD-10-CM

## 2024-04-24 PROCEDURE — 3700000002 HC GENERAL ANESTHESIA TIME - EACH INCREMENTAL 1 MINUTE

## 2024-04-24 PROCEDURE — 88305 TISSUE EXAM BY PATHOLOGIST: CPT | Mod: TC,ELYLAB | Performed by: SURGERY

## 2024-04-24 PROCEDURE — 45380 COLONOSCOPY AND BIOPSY: CPT | Performed by: SURGERY

## 2024-04-24 PROCEDURE — 7100000009 HC PHASE TWO TIME - INITIAL BASE CHARGE

## 2024-04-24 PROCEDURE — 2500000005 HC RX 250 GENERAL PHARMACY W/O HCPCS

## 2024-04-24 PROCEDURE — 2500000004 HC RX 250 GENERAL PHARMACY W/ HCPCS (ALT 636 FOR OP/ED)

## 2024-04-24 PROCEDURE — 2500000004 HC RX 250 GENERAL PHARMACY W/ HCPCS (ALT 636 FOR OP/ED): Performed by: STUDENT IN AN ORGANIZED HEALTH CARE EDUCATION/TRAINING PROGRAM

## 2024-04-24 PROCEDURE — 88305 TISSUE EXAM BY PATHOLOGIST: CPT | Performed by: PATHOLOGY

## 2024-04-24 PROCEDURE — 3700000001 HC GENERAL ANESTHESIA TIME - INITIAL BASE CHARGE

## 2024-04-24 PROCEDURE — 7100000010 HC PHASE TWO TIME - EACH INCREMENTAL 1 MINUTE

## 2024-04-24 RX ORDER — SODIUM CHLORIDE, SODIUM LACTATE, POTASSIUM CHLORIDE, CALCIUM CHLORIDE 600; 310; 30; 20 MG/100ML; MG/100ML; MG/100ML; MG/100ML
50 INJECTION, SOLUTION INTRAVENOUS CONTINUOUS
Status: DISCONTINUED | OUTPATIENT
Start: 2024-04-24 | End: 2024-04-25 | Stop reason: HOSPADM

## 2024-04-24 RX ORDER — LIDOCAINE HYDROCHLORIDE 20 MG/ML
INJECTION, SOLUTION INFILTRATION; PERINEURAL AS NEEDED
Status: DISCONTINUED | OUTPATIENT
Start: 2024-04-24 | End: 2024-04-24

## 2024-04-24 RX ORDER — PROPOFOL 10 MG/ML
INJECTION, EMULSION INTRAVENOUS CONTINUOUS PRN
Status: DISCONTINUED | OUTPATIENT
Start: 2024-04-24 | End: 2024-04-24

## 2024-04-24 RX ORDER — PROPOFOL 10 MG/ML
INJECTION, EMULSION INTRAVENOUS AS NEEDED
Status: DISCONTINUED | OUTPATIENT
Start: 2024-04-24 | End: 2024-04-24

## 2024-04-24 RX ADMIN — PROPOFOL 50 MG: 10 INJECTION, EMULSION INTRAVENOUS at 08:28

## 2024-04-24 RX ADMIN — PROPOFOL 100 MCG/KG/MIN: 10 INJECTION, EMULSION INTRAVENOUS at 08:21

## 2024-04-24 RX ADMIN — LIDOCAINE HYDROCHLORIDE 50 MG: 20 INJECTION, SOLUTION INFILTRATION; PERINEURAL at 08:21

## 2024-04-24 RX ADMIN — PROPOFOL 40 MG: 10 INJECTION, EMULSION INTRAVENOUS at 08:23

## 2024-04-24 RX ADMIN — PROPOFOL 100 MG: 10 INJECTION, EMULSION INTRAVENOUS at 08:21

## 2024-04-24 RX ADMIN — SODIUM CHLORIDE, POTASSIUM CHLORIDE, SODIUM LACTATE AND CALCIUM CHLORIDE 50 ML/HR: 600; 310; 30; 20 INJECTION, SOLUTION INTRAVENOUS at 07:30

## 2024-04-24 SDOH — HEALTH STABILITY: MENTAL HEALTH: CURRENT SMOKER: 0

## 2024-04-24 ASSESSMENT — PAIN SCALES - GENERAL
PAINLEVEL_OUTOF10: 0 - NO PAIN

## 2024-04-24 ASSESSMENT — PAIN - FUNCTIONAL ASSESSMENT
PAIN_FUNCTIONAL_ASSESSMENT: 0-10

## 2024-04-24 ASSESSMENT — COLUMBIA-SUICIDE SEVERITY RATING SCALE - C-SSRS
6. HAVE YOU EVER DONE ANYTHING, STARTED TO DO ANYTHING, OR PREPARED TO DO ANYTHING TO END YOUR LIFE?: NO
1. IN THE PAST MONTH, HAVE YOU WISHED YOU WERE DEAD OR WISHED YOU COULD GO TO SLEEP AND NOT WAKE UP?: NO
2. HAVE YOU ACTUALLY HAD ANY THOUGHTS OF KILLING YOURSELF?: NO

## 2024-04-24 NOTE — DISCHARGE INSTRUCTIONS
Patient Instructions after a Colonoscopy      The anesthetics, sedatives or narcotics which were given to you today will be acting in your body for the next 24 hours, so you might feel a little sleepy or groggy.  This feeling should slowly wear off. Carefully read and follow the instructions.     You received sedation today:  - Do not drive or operate any machinery or power tools of any kind.   - No alcoholic beverages today, not even beer or wine.  - Do not make any important decisions or sign any legal documents.  - No over the counter medications that contain alcohol or that may cause drowsiness.  - Do not make any important decisions or sign any legal documents.    While it is common to experience mild to moderate abdominal distention, gas, or belching after your procedure, if any of these symptoms occur following discharge from the GI Lab or within one week of having your procedure, call the [facility] to be advised whether a visit to your nearest Urgent Care or Emergency Department is indicated.  Take this paper with you if you go.     - If you develop an allergic reaction to the medications that were given during your procedure such as difficulty breathing, rash, hives, severe nausea, vomiting or lightheadedness.  - If you experience chest pain, shortness of breath, severe abdominal pain, fevers and chills.  -If you develop signs and symptoms of bleeding such as blood in your spit, if your stools turn black, tarry, or bloody  - If you have not urinated within 8 hours following your procedure.  - If your IV site becomes painful, red, inflamed, or looks infected.    If you received a biopsy/polypectomy/sphincterotomy the following instructions apply below:    __ Do not use Aspirin containing products, non-steroidal medications or anti-coagulants for one week following your procedure. (Examples of these types of medications are: Advil, Arthrotec, Aleve, Coumadin, Ecotrin, Heparin, Ibuprofen,  Indocin, Motrin, Naprosyn, Nuprin, Plavix, Vioxx, and Voltarin, or their generic forms.  This list is not all-inclusive.  Check with your physician or pharmacist before resuming medications.)   __ Eat a soft diet today.  Avoid foods that are poorly digested for the next 24 hours.  These foods would include: nuts, beans, lettuce, red meats, and fried foods. Start with liquids and advance your diet as tolerated, gradually work up to eating solids.   __ Do not have a Barium Study or Enema for one week.    Your physician recommends the additional following instructions:    -You have a contact number available for emergencies. The signs and symptoms of potential delayed complications were discussed with you. You may return to normal activities tomorrow.  -Resume your previous diet.  -Continue your present medications.   -We are waiting for your pathology results.  -Your physician has recommended a repeat colonoscopy (date to be determined after pending pathology results are reviewed) for surveillance based on pathology results.  -The findings and recommendations have been discussed with you.  -The findings and recommendations were discussed with your family.  - Please see Medication Reconciliation Form for new medication/medications prescribed.       If you experience any problems or have any questions following discharge from the GI Lab, please call:  Before 5p.m.  [phone]  After 5p.m.    [phone]    Nurse Signature                                                                        Date___________________                                                                            Patient/Responsible Party Signature                                        Date___________________

## 2024-04-24 NOTE — H&P (VIEW-ONLY)
History Of Present Illness  63-year-old male patient history of perforated acute appendicitis with abscess requiring drain placement on February 9. The drain fell out on February 18.  CT abd/pelvis scan shows 12 mm residual hypodense fluid collection. I last saw him on February 29; denies nausea, vomiting, abdominal pain and fevers. He is tolerating regular diet.  He has completed the Invanz.       Past Medical History  Past Medical History:   Diagnosis Date    Abscess due to peritonitis (Multi)     Bacterial infection     Change or removal of drains     RLQ DRAIN REMOVED    COVID-19     NOT VACCINATED    Personal history of other diseases of the nervous system and sense organs     History of Bell's palsy    PICC (peripherally inserted central catheter) removal     Ruptured appendix     Wears glasses        Surgical History  Past Surgical History:   Procedure Laterality Date    OTHER SURGICAL HISTORY  03/30/2021    Microlaminectomy        Social History  He reports that he has never smoked. He has never used smokeless tobacco. He reports that he does not currently use alcohol. He reports that he does not use drugs.    Family History  No family history on file.     Allergies  Patient has no known allergies.    Review of Systems     Physical Exam   Constitutional: no acute distress, well appearing and well nourished  Pulmonary: normal respiratory effort; clear to auscultation bilaterally, no wheezes or bronchi   Cardiovascular: regular rate and rhythm, no murmurs or extra-heart sounds; pedal pulses are normal; no extremities edema or varicosities, no peripheral edema  Abdomen: soft, non-tender, non-distended; no organomegaly; no peritoneal sign, no hernia  Musculoskeletal: digits and nails normal without clubbing or cyanosis; Joints, bones and muscles are normal with normal range of motion; muscle strength/tone is normal  Skin: normal without rashes or lesion  Neurologic: cranial nerve II-XII intact grossly; normal  "gait  Psychiatric: oriented to person, place and time  Last Recorded Vitals  Blood pressure 128/72, pulse 56, temperature 36.4 °C (97.5 °F), temperature source Temporal, resp. rate 18, height 1.803 m (5' 11\"), weight 73.7 kg (162 lb 7.7 oz), SpO2 100%.    Relevant Results         Assessment/Plan   63-year-old male patient with history of perforated acute appendicitis with abscess requiring drain placement on February 9.  Drain fell out on 18 with CT scan showing 12 mm residual abscess. He is doing well and has completed 4 wks of Invanz.  Because he has never had a screening colonoscopy, he will undergo one prior to the appendectomy. He is consented for colonoscopy, possible polypectomy, possible biopsy and laparoscopic appendectomy, possible open, possible bowel resection, possible open.  I explained to him the procedure, the risks and complications which include but not limited to bleeding, infection and bowel injury.       Chiki Samuel MD    "

## 2024-04-24 NOTE — Clinical Note
Patient tolerated procedure well. Appears comfortable with no complaints of pain. VS stable. Arousable prior to transport. Patient transported to [facility] via cart.  Report called     [facility] rn         . Handoff completed

## 2024-04-24 NOTE — ANESTHESIA PREPROCEDURE EVALUATION
Tacho Conway is a 63 y.o. male here for:    Colonoscopy  With Chiki Samuel MD  Encounter for screening colonoscopy    Relevant Problems   No relevant active problems       Lab Results   Component Value Date    HGB 14.1 03/07/2024    HCT 42.3 03/07/2024    WBC 6.1 03/07/2024     03/07/2024     03/07/2024    K 4.5 03/07/2024     03/07/2024    CREATININE 0.98 03/07/2024    BUN 13 03/07/2024       Social History     Tobacco Use   Smoking Status Never   Smokeless Tobacco Never       No Known Allergies    Current Outpatient Medications   Medication Instructions    cholecalciferol (VITAMIN D-3) 2,000 Units, oral, Daily    gabapentin (NEURONTIN) 300 mg, oral, As needed    ibuprofen 800 mg, oral, Every 6 hours PRN    melatonin 5 mg, oral, Nightly PRN    polyethylene glycol (MIRALAX) 17 g, oral, Daily PRN    tiZANidine (Zanaflex) 4 mg tablet 1 tablet, oral, 3 times daily PRN    zinc acetate 100 mg, oral, Daily       Past Surgical History:   Procedure Laterality Date    OTHER SURGICAL HISTORY  03/30/2021    Microlaminectomy       No family history on file.    NPO Details:  No data recorded    Physical Exam    Airway  Mallampati: II  TM distance: >3 FB  Neck ROM: full     Cardiovascular - normal exam     Dental - normal exam     Pulmonary - normal exam     Abdominal            Anesthesia Plan    History of general anesthesia?: yes  History of complications of general anesthesia?: no    ASA 2     MAC     The patient is not a current smoker.    intravenous induction   Anesthetic plan and risks discussed with patient.    Plan discussed with CRNA.

## 2024-04-24 NOTE — Clinical Note
Huddle and Timeout completed together with team. Patient wristband and KAY information verified.  All gas lines verified per Anesthesia. Verified with team gas hose placements

## 2024-04-24 NOTE — ANESTHESIA POSTPROCEDURE EVALUATION
Patient: Tacho Conway    Procedure Summary       Date: 04/24/24 Room / Location: [facility]    Anesthesia Start: 0815 Anesthesia Stop:     Procedure: COLONOSCOPY Diagnosis: Encounter for screening colonoscopy    Scheduled Providers: Chiki Samuel MD; J Luis Nazario DO; Mady Singh RN; Ca Rosas Responsible Provider: J Luis Nazario DO    Anesthesia Type: MAC ASA Status: 2            Anesthesia Type: MAC    Vitals Value Taken Time   /62 04/24/24 0852   Temp - 04/24/24 0852   Pulse 60 04/24/24 0852   Resp 16 04/24/24 0852   SpO2 98 04/24/24 0852       Anesthesia Post Evaluation    Patient location during evaluation: PACU  Patient participation: complete - patient participated  Level of consciousness: awake  Pain management: adequate  Multimodal analgesia pain management approach  Airway patency: patent  Cardiovascular status: acceptable  Respiratory status: acceptable  Hydration status: acceptable  Postoperative Nausea and Vomiting: none        No notable events documented.

## 2024-04-25 ENCOUNTER — ANESTHESIA EVENT (OUTPATIENT)
Dept: OPERATING ROOM | Facility: HOSPITAL | Age: 64
End: 2024-04-25
Payer: COMMERCIAL

## 2024-04-26 ENCOUNTER — HOSPITAL ENCOUNTER (OUTPATIENT)
Facility: HOSPITAL | Age: 64
Setting detail: OUTPATIENT SURGERY
Discharge: HOME | End: 2024-04-26
Attending: SURGERY | Admitting: SURGERY
Payer: COMMERCIAL

## 2024-04-26 ENCOUNTER — ANESTHESIA (OUTPATIENT)
Dept: OPERATING ROOM | Facility: HOSPITAL | Age: 64
End: 2024-04-26
Payer: COMMERCIAL

## 2024-04-26 VITALS
DIASTOLIC BLOOD PRESSURE: 70 MMHG | HEIGHT: 71 IN | BODY MASS INDEX: 22.9 KG/M2 | WEIGHT: 163.58 LBS | OXYGEN SATURATION: 96 % | HEART RATE: 65 BPM | SYSTOLIC BLOOD PRESSURE: 135 MMHG | RESPIRATION RATE: 18 BRPM | TEMPERATURE: 98.1 F

## 2024-04-26 DIAGNOSIS — Z90.49 S/P LAPAROSCOPIC APPENDECTOMY: Primary | ICD-10-CM

## 2024-04-26 DIAGNOSIS — K35.211 ACUTE APPENDICITIS WITH PERFORATION, GENERALIZED PERITONITIS, AND ABSCESS, WITHOUT GANGRENE: ICD-10-CM

## 2024-04-26 PROCEDURE — 3700000002 HC GENERAL ANESTHESIA TIME - EACH INCREMENTAL 1 MINUTE: Performed by: SURGERY

## 2024-04-26 PROCEDURE — 3600000009 HC OR TIME - EACH INCREMENTAL 1 MINUTE - PROCEDURE LEVEL FOUR: Performed by: SURGERY

## 2024-04-26 PROCEDURE — 0752T DGTZ GLS MCRSCP SLD LVL III: CPT | Mod: TC,ELYLAB,PARLAB | Performed by: SURGERY

## 2024-04-26 PROCEDURE — 3700000001 HC GENERAL ANESTHESIA TIME - INITIAL BASE CHARGE: Performed by: SURGERY

## 2024-04-26 PROCEDURE — 7100000009 HC PHASE TWO TIME - INITIAL BASE CHARGE: Performed by: SURGERY

## 2024-04-26 PROCEDURE — 7100000010 HC PHASE TWO TIME - EACH INCREMENTAL 1 MINUTE: Performed by: SURGERY

## 2024-04-26 PROCEDURE — 2500000005 HC RX 250 GENERAL PHARMACY W/O HCPCS: Performed by: ANESTHESIOLOGY

## 2024-04-26 PROCEDURE — 3600000004 HC OR TIME - INITIAL BASE CHARGE - PROCEDURE LEVEL FOUR: Performed by: SURGERY

## 2024-04-26 PROCEDURE — A4217 STERILE WATER/SALINE, 500 ML: HCPCS | Performed by: SURGERY

## 2024-04-26 PROCEDURE — 7100000001 HC RECOVERY ROOM TIME - INITIAL BASE CHARGE: Performed by: SURGERY

## 2024-04-26 PROCEDURE — 2500000004 HC RX 250 GENERAL PHARMACY W/ HCPCS (ALT 636 FOR OP/ED): Performed by: SURGERY

## 2024-04-26 PROCEDURE — 2500000004 HC RX 250 GENERAL PHARMACY W/ HCPCS (ALT 636 FOR OP/ED): Mod: JZ | Performed by: SURGERY

## 2024-04-26 PROCEDURE — 2500000005 HC RX 250 GENERAL PHARMACY W/O HCPCS: Performed by: SURGERY

## 2024-04-26 PROCEDURE — 88304 TISSUE EXAM BY PATHOLOGIST: CPT | Performed by: PATHOLOGY

## 2024-04-26 PROCEDURE — 2500000004 HC RX 250 GENERAL PHARMACY W/ HCPCS (ALT 636 FOR OP/ED): Performed by: ANESTHESIOLOGY

## 2024-04-26 PROCEDURE — 44970 LAPAROSCOPY APPENDECTOMY: CPT | Performed by: SURGERY

## 2024-04-26 PROCEDURE — 7100000002 HC RECOVERY ROOM TIME - EACH INCREMENTAL 1 MINUTE: Performed by: SURGERY

## 2024-04-26 PROCEDURE — 2720000007 HC OR 272 NO HCPCS: Performed by: SURGERY

## 2024-04-26 RX ORDER — SODIUM CHLORIDE 0.9 G/100ML
IRRIGANT IRRIGATION AS NEEDED
Status: DISCONTINUED | OUTPATIENT
Start: 2024-04-26 | End: 2024-04-26 | Stop reason: HOSPADM

## 2024-04-26 RX ORDER — SODIUM CHLORIDE, SODIUM LACTATE, POTASSIUM CHLORIDE, CALCIUM CHLORIDE 600; 310; 30; 20 MG/100ML; MG/100ML; MG/100ML; MG/100ML
100 INJECTION, SOLUTION INTRAVENOUS CONTINUOUS
Status: DISCONTINUED | OUTPATIENT
Start: 2024-04-26 | End: 2024-04-26 | Stop reason: HOSPADM

## 2024-04-26 RX ORDER — DOCUSATE SODIUM 100 MG/1
100 CAPSULE, LIQUID FILLED ORAL 2 TIMES DAILY
Qty: 20 CAPSULE | Refills: 1 | Status: SHIPPED | OUTPATIENT
Start: 2024-04-26

## 2024-04-26 RX ORDER — MIDAZOLAM HYDROCHLORIDE 1 MG/ML
INJECTION, SOLUTION INTRAMUSCULAR; INTRAVENOUS AS NEEDED
Status: DISCONTINUED | OUTPATIENT
Start: 2024-04-26 | End: 2024-04-26

## 2024-04-26 RX ORDER — CEFAZOLIN SODIUM 2 G/100ML
2 INJECTION, SOLUTION INTRAVENOUS ONCE
Status: COMPLETED | OUTPATIENT
Start: 2024-04-26 | End: 2024-04-26

## 2024-04-26 RX ORDER — GLYCOPYRROLATE 0.2 MG/ML
INJECTION INTRAMUSCULAR; INTRAVENOUS AS NEEDED
Status: DISCONTINUED | OUTPATIENT
Start: 2024-04-26 | End: 2024-04-26

## 2024-04-26 RX ORDER — BUPIVACAINE HCL/EPINEPHRINE 0.25-.0005
VIAL (ML) INJECTION AS NEEDED
Status: DISCONTINUED | OUTPATIENT
Start: 2024-04-26 | End: 2024-04-26 | Stop reason: HOSPADM

## 2024-04-26 RX ORDER — ROCURONIUM BROMIDE 10 MG/ML
INJECTION, SOLUTION INTRAVENOUS AS NEEDED
Status: DISCONTINUED | OUTPATIENT
Start: 2024-04-26 | End: 2024-04-26

## 2024-04-26 RX ORDER — ONDANSETRON HYDROCHLORIDE 2 MG/ML
4 INJECTION, SOLUTION INTRAVENOUS ONCE AS NEEDED
Status: DISCONTINUED | OUTPATIENT
Start: 2024-04-26 | End: 2024-04-26 | Stop reason: HOSPADM

## 2024-04-26 RX ORDER — OXYCODONE HYDROCHLORIDE 5 MG/1
5 TABLET ORAL EVERY 4 HOURS PRN
Status: DISCONTINUED | OUTPATIENT
Start: 2024-04-26 | End: 2024-04-26 | Stop reason: HOSPADM

## 2024-04-26 RX ORDER — LIDOCAINE HYDROCHLORIDE 20 MG/ML
INJECTION, SOLUTION INFILTRATION; PERINEURAL AS NEEDED
Status: DISCONTINUED | OUTPATIENT
Start: 2024-04-26 | End: 2024-04-26

## 2024-04-26 RX ORDER — FENTANYL CITRATE 50 UG/ML
50 INJECTION, SOLUTION INTRAMUSCULAR; INTRAVENOUS EVERY 5 MIN PRN
Status: DISCONTINUED | OUTPATIENT
Start: 2024-04-26 | End: 2024-04-26 | Stop reason: HOSPADM

## 2024-04-26 RX ORDER — PROPOFOL 10 MG/ML
INJECTION, EMULSION INTRAVENOUS AS NEEDED
Status: DISCONTINUED | OUTPATIENT
Start: 2024-04-26 | End: 2024-04-26

## 2024-04-26 RX ORDER — HYDROMORPHONE HYDROCHLORIDE 1 MG/ML
INJECTION, SOLUTION INTRAMUSCULAR; INTRAVENOUS; SUBCUTANEOUS AS NEEDED
Status: DISCONTINUED | OUTPATIENT
Start: 2024-04-26 | End: 2024-04-26

## 2024-04-26 RX ORDER — ONDANSETRON HYDROCHLORIDE 2 MG/ML
INJECTION, SOLUTION INTRAVENOUS AS NEEDED
Status: DISCONTINUED | OUTPATIENT
Start: 2024-04-26 | End: 2024-04-26

## 2024-04-26 RX ORDER — MEPERIDINE HYDROCHLORIDE 25 MG/ML
12.5 INJECTION INTRAMUSCULAR; INTRAVENOUS; SUBCUTANEOUS EVERY 10 MIN PRN
Status: DISCONTINUED | OUTPATIENT
Start: 2024-04-26 | End: 2024-04-26 | Stop reason: HOSPADM

## 2024-04-26 RX ORDER — FENTANYL CITRATE 50 UG/ML
INJECTION, SOLUTION INTRAMUSCULAR; INTRAVENOUS AS NEEDED
Status: DISCONTINUED | OUTPATIENT
Start: 2024-04-26 | End: 2024-04-26

## 2024-04-26 RX ORDER — HYDROCODONE BITARTRATE AND ACETAMINOPHEN 5; 325 MG/1; MG/1
1 TABLET ORAL EVERY 6 HOURS PRN
Qty: 20 TABLET | Refills: 0 | Status: SHIPPED | OUTPATIENT
Start: 2024-04-26

## 2024-04-26 RX ORDER — KETOROLAC TROMETHAMINE 30 MG/ML
INJECTION, SOLUTION INTRAMUSCULAR; INTRAVENOUS AS NEEDED
Status: DISCONTINUED | OUTPATIENT
Start: 2024-04-26 | End: 2024-04-26

## 2024-04-26 RX ADMIN — FENTANYL CITRATE 50 MCG: 50 INJECTION, SOLUTION INTRAMUSCULAR; INTRAVENOUS at 11:07

## 2024-04-26 RX ADMIN — PROPOFOL 150 MG: 10 INJECTION, EMULSION INTRAVENOUS at 11:07

## 2024-04-26 RX ADMIN — GLYCOPYRROLATE 0.2 MG: 0.2 INJECTION, SOLUTION INTRAMUSCULAR; INTRAVENOUS at 11:00

## 2024-04-26 RX ADMIN — SODIUM CHLORIDE, POTASSIUM CHLORIDE, SODIUM LACTATE AND CALCIUM CHLORIDE: 600; 310; 30; 20 INJECTION, SOLUTION INTRAVENOUS at 11:00

## 2024-04-26 RX ADMIN — MIDAZOLAM 2 MG: 1 INJECTION INTRAMUSCULAR; INTRAVENOUS at 11:00

## 2024-04-26 RX ADMIN — LIDOCAINE HYDROCHLORIDE 60 MG: 20 INJECTION, SOLUTION INFILTRATION; PERINEURAL at 11:07

## 2024-04-26 RX ADMIN — HYDROMORPHONE HYDROCHLORIDE 1 MG: 1 INJECTION, SOLUTION INTRAMUSCULAR; INTRAVENOUS; SUBCUTANEOUS at 11:28

## 2024-04-26 RX ADMIN — ROCURONIUM BROMIDE 80 MG: 10 SOLUTION INTRAVENOUS at 11:07

## 2024-04-26 RX ADMIN — ONDANSETRON 4 MG: 2 INJECTION, SOLUTION INTRAMUSCULAR; INTRAVENOUS at 11:00

## 2024-04-26 RX ADMIN — SODIUM CHLORIDE, POTASSIUM CHLORIDE, SODIUM LACTATE AND CALCIUM CHLORIDE 100 ML/HR: 600; 310; 30; 20 INJECTION, SOLUTION INTRAVENOUS at 10:27

## 2024-04-26 RX ADMIN — KETOROLAC TROMETHAMINE 30 MG: 30 INJECTION INTRAMUSCULAR; INTRAVENOUS at 12:07

## 2024-04-26 RX ADMIN — FENTANYL CITRATE 50 MCG: 50 INJECTION, SOLUTION INTRAMUSCULAR; INTRAVENOUS at 12:07

## 2024-04-26 RX ADMIN — DEXAMETHASONE SODIUM PHOSPHATE 8 MG: 4 INJECTION, SOLUTION INTRAMUSCULAR; INTRAVENOUS at 11:07

## 2024-04-26 RX ADMIN — SUGAMMADEX 300 MG: 100 INJECTION, SOLUTION INTRAVENOUS at 12:14

## 2024-04-26 RX ADMIN — CEFAZOLIN SODIUM 2 G: 2 INJECTION, SOLUTION INTRAVENOUS at 11:15

## 2024-04-26 SDOH — HEALTH STABILITY: MENTAL HEALTH: CURRENT SMOKER: 0

## 2024-04-26 ASSESSMENT — PAIN SCALES - GENERAL
PAINLEVEL_OUTOF10: 0 - NO PAIN

## 2024-04-26 ASSESSMENT — PAIN - FUNCTIONAL ASSESSMENT
PAIN_FUNCTIONAL_ASSESSMENT: 0-10

## 2024-04-26 ASSESSMENT — COLUMBIA-SUICIDE SEVERITY RATING SCALE - C-SSRS
2. HAVE YOU ACTUALLY HAD ANY THOUGHTS OF KILLING YOURSELF?: NO
1. IN THE PAST MONTH, HAVE YOU WISHED YOU WERE DEAD OR WISHED YOU COULD GO TO SLEEP AND NOT WAKE UP?: NO
6. HAVE YOU EVER DONE ANYTHING, STARTED TO DO ANYTHING, OR PREPARED TO DO ANYTHING TO END YOUR LIFE?: NO

## 2024-04-26 NOTE — ANESTHESIA POSTPROCEDURE EVALUATION
Patient: Tacho Conway    Procedure Summary       Date: 04/26/24 Room / Location: [facility]    Anesthesia Start: 1055 Anesthesia Stop: 1229    Procedure: Laparoscopic Partial Cecectomy (Abdomen) Diagnosis:       Acute appendicitis with perforation, generalized peritonitis, and abscess, without gangrene      (Acute appendicitis with perforation, generalized peritonitis, and abscess, without gangrene [K35.211])    Surgeons: Chiki Samuel MD Responsible Provider: Cornelio Cervantes MD    Anesthesia Type: general ASA Status: 2            Anesthesia Type: general    Vitals Value Taken Time   /63 04/26/24 1224   Temp 36.2 04/26/24 1229   Pulse 80 04/26/24 1228   Resp 19 04/26/24 1228   SpO2 99 % 04/26/24 1228   Vitals shown include unfiled device data.    Anesthesia Post Evaluation    Patient location during evaluation: PACU  Patient participation: complete - patient participated  Level of consciousness: sleepy but conscious  Pain management: adequate  Airway patency: patent  Cardiovascular status: acceptable, blood pressure returned to baseline and hemodynamically stable  Respiratory status: acceptable, nonlabored ventilation, spontaneous ventilation, face mask and unassisted  Hydration status: acceptable  Postoperative Nausea and Vomiting: none      There were no known notable events for this encounter.

## 2024-04-26 NOTE — ANESTHESIA PREPROCEDURE EVALUATION
Patient: Tacho Conway    Procedure Information       Date/Time: 04/26/24 1045    Procedure: Appendectomy Laparoscopy    Location: [facility]    Surgeons: Chiki Samuel MD            Relevant Problems   GI  Appendicitis       Clinical information reviewed:   Tobacco  Allergies  Meds   Med Hx  Surg Hx   Fam Hx  Soc Hx        NPO Detail:  No data recorded     Physical Exam    Airway  Mallampati: II  TM distance: >3 FB  Neck ROM: full     Cardiovascular    Dental - normal exam     Pulmonary    Abdominal        Anesthesia Plan    History of general anesthesia?: yes  History of complications of general anesthesia?: no    ASA 2     general     The patient is not a current smoker.    intravenous induction   Anesthetic plan and risks discussed with patient.    Plan discussed with CRNA.

## 2024-04-26 NOTE — ANESTHESIA PROCEDURE NOTES
Airway  Date/Time: 4/26/2024 11:10 AM  Urgency: elective    Airway not difficult    Staffing  Performed: CRNA   Authorized by: Cornelio Cervantes MD    Performed by: KANG Dawson-YUDELKA  Patient location during procedure: OR    Indications and Patient Condition  Indications for airway management: anesthesia and airway protection  Spontaneous Ventilation: absent  Sedation level: deep  Preoxygenated: yes  Mask difficulty assessment: 1 - vent by mask    Final Airway Details  Final airway type: endotracheal airway      Successful airway: ETT  Cuffed: yes   Successful intubation technique: video laryngoscopy  Facilitating devices/methods: intubating stylet  Endotracheal tube insertion site: oral  Blade size: #3  ETT size (mm): 7.5  Cormack-Lehane Classification: grade IIa - partial view of glottis  Placement verified by: chest auscultation and capnometry   Measured from: gums  ETT to gums (cm): 22  Number of attempts at approach: 1

## 2024-04-26 NOTE — DISCHARGE INSTRUCTIONS
Ok to shower; remove glue in 1 wk; avoid heavy lifting for 1-2 wks    General Anesthesia Discharge Instructions    About this topic  You may need general anesthesia if you need to be asleep during a procedure. Your doctor will use drugs to block the signals that go from your nerves to your brain. Doctors give general anesthesia during a surgery or procedure to:  Allow you to sleep  Help your body be still  Relax your muscles  Help you to relax and be pain free  Keep you from remembering the surgery  Let the doctor manage your airway, breathing, and blood flow  The doctor or nurse anesthetist gives general anesthesia by a shot into your vein. Sometimes, you may breathe in a gas through a mask placed over your face.  What care is needed at home?  Ask your doctor what you need to do when you go home. Make sure you ask questions if you do not understand what the doctor says.  Your doctor may give you drugs to prevent or treat an upset stomach from the anesthetic. Take them as ordered.  If your throat is sore, suck on ice chips or popsicles to ease throat pain.  Put 2 to 3 pillows under your head and back when you lie down to help you breathe easier.  For the first 24 to 48 hours:  Do not operate heavy or dangerous machinery.  Do not make major decisions or sign important papers. You may not be able to think clearly.  Avoid beer, wine, or mixed drinks.  You are at a higher risk of falling for at least 24 hours after general anesthesia.  Take extra care when you get up.  Do not change positions quickly.  Do not rush when you need to go to the bathroom or to answer the phone.  Ask for help if you feel unsteady when you try to walk.  Wear shoes with non-slip soles and low heels.  What follow-up care is needed?  Your doctor may ask you to come back to the office to check on your progress. Be sure to keep these visits.  If you have stitches that do not dissolve or staples, you will need to have them removed. Your doctor will  want to do this in 1 to 2 weeks. If the doctor used skin glue, the glue will fall off on its own.  What drugs may be needed?  The doctor may order drugs to:  Help with pain  Treat an upset stomach or throwing up  Will physical activity be limited?  You will not be allowed to drive right away after the procedure. Ask a family member or a friend to drive you home.  Avoid trying to get out of bed without help until you are sure of your balance.  You may have to limit your activity. Talk to your doctor about if you need to limit how much you lift or limit exercise after your procedure.  What changes to diet are needed?  Start with a light diet when you are fully awake. This includes things that are easy to swallow like soups, pudding, jello, toast, and eggs. Slowly progress to your normal diet.  What problems could happen?  Low blood pressure  Breathing problems  Upset stomach or throwing up  Dizziness  Blood clots  Infection  When do I need to call the doctor?  Trouble breathing  Upset stomach or throwing up more than 3 times in the next 2 days

## 2024-04-26 NOTE — OP NOTE
Laparoscopic Partial Cecectomy Operative Note     Date: 2024  OR Location: [facility]    Name: Tacho Conway, : 1960, Age: 63 y.o., MRN: [id number], Sex: male    Diagnosis  Preop: perforated appendicitis with abscess Post-op Diagnosis     same     Procedures  Laparoscopic partial cecectomy     Surgeons      * Chiki Samuel - Primary    Resident/Fellow/Other Assistant:  Surgeons and Role: Jim       Procedure Summary  Anesthesia: General  ASA: II  Anesthesia Staff: Anesthesiologist: Cornelio Cervantes MD  CRNA: KANG Dawson-CRNA  Estimated Blood Loss: minimal   Intra-op Medications:   Administrations occurring from 1045 to 1245 on 24:   Medication Name Total Dose   sodium chloride 0.9 % irrigation solution 4,000 mL   BUPivacaine-EPINEPHrine (Marcaine w/EPI) 0.25 %-1:200,000 injection 89 mL   ceFAZolin in dextrose (iso-os) (Ancef) IVPB 2 g 2 g              Anesthesia Record               Intraprocedure I/O Totals          Intake    LR bolus 1000.00 mL    Total Intake 1000 mL          Specimen:   ID Type Source Tests Collected by Time   1 : Appendix Tissue APPENDIX SURGICAL PATHOLOGY EXAM Chiki Samuel MD 2024 1145        Staff:   Circulator: Samantha Rodarte RN  Relief Circulator: Kylie Henderson RN  Scrub Person: Kathe Graves          Findings: perforation at base of appendix; cecum and appendix adherent to right abdominal wall    INDICATION  63-year-old male patient with history of perforated acute appendicitis with abscess requiring drain placement on  and subsequent removal. He was treated with Invanz. He underwent colonoscopy and polypectomy performed. He is consented for laparoscopic appendectomy, possible open, possible open.  I explained to him the procedure, the risks and complications which include but not limited to bleeding, infection and bowel injury. All questions answered and willing to proceed.      Details of the procedure:   After informed consent was obtained, the  patient was brought into the operating room and placed in the supine position. Bilateral SCDs were placed. Huddle and timeout were performed. General anesthesia was obtained without difficulty. Bilateral arms were tucked and padded and all pressure points were padded and protected. He got 5000 units of subcutaneous heparin and Ancef. Hair on abdomen was clipped. Abdomen was prepped and draped with ChloraPrep. Stab incision made in the supra-umbilical region; Veress needle placed and pneumoperitoneal was obtained with C02 at 15 mmhg. A 5 mm trocar was placed; no evidence of iatrogenic injury. I placed series of trocars: 5 mm in infra-umbilical and 12 mm trocar in left mid abdominal region.The patient was placed in Trendelenburg with the right side up. Cecum and appendix adherent to RLQ abdominal wall. Adhesions and cecum mobilized using the suction device and cautery. Perforation at base of the appendix. Mesoappendix controlled with Bipolar; after mobilization of cecum and ensuring TI out of the way, a partial cecectomy performed using EndoGIA 60 mm blue load. Hemostasis obtained with Bipolar; operative field as irrigated. The specimen was removed in a specimen bag. Omentum placed over the staple line.The 12 mm trocar site was approximated with two #1 Ethibond with a Theo Dean in a figure of eight fashion. Farhan block performed. CO2 removed. The skin was approximated with 4-0 monocryl in subcuticular fashion. LiquiBand placed. The patient was extubated. Instruments and sponge counts were correct.  Plans discussed with patient's wife and all questions answered        Attending Attestation:     Chiki Samuel  Phone Number: [phone]

## 2024-04-29 LAB
LABORATORY COMMENT REPORT: NORMAL
PATH REPORT.FINAL DX SPEC: NORMAL
PATH REPORT.GROSS SPEC: NORMAL
PATH REPORT.TOTAL CANCER: NORMAL

## 2024-05-06 ENCOUNTER — OFFICE VISIT (OUTPATIENT)
Dept: SURGERY | Facility: CLINIC | Age: 64
End: 2024-05-06
Payer: COMMERCIAL

## 2024-05-06 VITALS — HEART RATE: 49 BPM | SYSTOLIC BLOOD PRESSURE: 138 MMHG | DIASTOLIC BLOOD PRESSURE: 63 MMHG

## 2024-05-06 DIAGNOSIS — L25.3 CONTACT DERMATITIS DUE TO CHEMICALS: ICD-10-CM

## 2024-05-06 DIAGNOSIS — T81.49XA WOUND INFECTION AFTER SURGERY: ICD-10-CM

## 2024-05-06 DIAGNOSIS — Z90.49 S/P LAPAROSCOPIC APPENDECTOMY: Primary | ICD-10-CM

## 2024-05-06 PROCEDURE — 99024 POSTOP FOLLOW-UP VISIT: CPT | Performed by: SURGERY

## 2024-05-06 RX ORDER — CAMPHOR 0.45 %
GEL (GRAM) TOPICAL 3 TIMES DAILY PRN
Qty: 28 G | Refills: 1 | Status: SHIPPED | OUTPATIENT
Start: 2024-05-06 | End: 2024-06-05

## 2024-05-06 RX ORDER — SULFAMETHOXAZOLE AND TRIMETHOPRIM 800; 160 MG/1; MG/1
1 TABLET ORAL 2 TIMES DAILY
Qty: 14 TABLET | Refills: 0 | Status: SHIPPED | OUTPATIENT
Start: 2024-05-06 | End: 2024-05-13

## 2024-05-06 NOTE — PROGRESS NOTES
Subjective   Patient ID: Tacho Conway is a 63 y.o. male who presents for Post-op (Possible infected surgical site.).  HPI  63-year-old male patient who underwent laparoscopic partial cecectomy on 4/26 for perforated acute appendicitis which was initially treated with drain and IV abx.  Perforation was at the base of the appendix therefore partial cecectomy was performed.  He started having clear drainage from the supraumbilical wound for the past 5 day; also noticed the surrounding area was red and warm to touch; denies fevers and chills.        Objective   Physical Exam  Abd: soft, non-distended; appropriate tenderness over incisions; 10x15 cm area of induration, wheals, warm; scant serous drainage from supra-umbilical incision, no abscess  Assessment/Plan   The patient has superficial wound infection but also has allergic reaction most likely from the ChloraPrep that was used during surgery.  No abscess.  Bactrim for 7 days.  Topical Benadryl for the itching.  Follow-up in 1 month       Chiki Samuel MD 05/06/24 11:38 AM

## 2024-05-06 NOTE — PATIENT INSTRUCTIONS
Apply Benedryl to red around to help with itching; take the antibiotic for 7 days; keep area clean and dry; followup in 1 wk

## 2024-05-07 LAB
LABORATORY COMMENT REPORT: NORMAL
PATH REPORT.FINAL DX SPEC: NORMAL
PATH REPORT.GROSS SPEC: NORMAL
PATH REPORT.RELEVANT HX SPEC: NORMAL
PATH REPORT.TOTAL CANCER: NORMAL

## 2024-05-10 ENCOUNTER — TELEPHONE (OUTPATIENT)
Dept: SURGERY | Facility: CLINIC | Age: 64
End: 2024-05-10
Payer: COMMERCIAL

## 2024-05-10 NOTE — TELEPHONE ENCOUNTER
I called patient; he started Bactrim DS on Monday and developed some rash on face and extremity with itching; rash is receeding and no difficulty breathing. The abx ends on Sunday; further instructions provided to call if rash reappears, itching or difficulty breathing; all questions answered

## 2024-05-13 ENCOUNTER — OFFICE VISIT (OUTPATIENT)
Dept: SURGERY | Facility: CLINIC | Age: 64
End: 2024-05-13
Payer: COMMERCIAL

## 2024-05-13 VITALS
SYSTOLIC BLOOD PRESSURE: 117 MMHG | DIASTOLIC BLOOD PRESSURE: 68 MMHG | WEIGHT: 163 LBS | BODY MASS INDEX: 22.73 KG/M2 | HEART RATE: 57 BPM

## 2024-05-13 DIAGNOSIS — Z90.49 S/P LAPAROSCOPIC APPENDECTOMY: Primary | ICD-10-CM

## 2024-05-13 DIAGNOSIS — T81.49XA WOUND INFECTION AFTER SURGERY: ICD-10-CM

## 2024-05-13 PROCEDURE — 1036F TOBACCO NON-USER: CPT | Performed by: SURGERY

## 2024-05-13 PROCEDURE — 99024 POSTOP FOLLOW-UP VISIT: CPT | Performed by: SURGERY

## 2024-05-13 NOTE — PROGRESS NOTES
Subjective   Patient ID: Tacho Conway is a 63 y.o. male who presents for Follow-up.  HPI  Patient is here in follow-up and doing well.  He completed the Bactrim DS on Sunday. The rashes have scabbed over and erythema have resolved. Denies itching, fevers and chills      Objective   Physical Exam  Abd: soft, non-tender, non-distended; no recurrent infection or rash  Assessment/Plan   Satisfactory postoperative course.  Resolution of wound infection; patient is doing well.  Follow-up as needed         Chiki Samuel MD 05/13/24 11:33 AM

## (undated) DEVICE — CORD, MONOPOLARD, 10FT, DISP

## (undated) DEVICE — SYRINGE, 10 CC, LUER LOCK

## (undated) DEVICE — GOWN, SURGICAL, ROYAL SILK, XL, STERILE

## (undated) DEVICE — GLOVE, SURGICAL, PROTEXIS NEOPRENE, 8.0, PF, LF

## (undated) DEVICE — TROCAR SYSTEM, BALLOON, KII GELPORT, 12 X 100MM

## (undated) DEVICE — GLOVE, SURGICAL, PROTEXIS PI BLUE W/NEUTHERA, 8.0, PF, LF

## (undated) DEVICE — APPLICATOR, CHLORAPREP, W/ORANGE TINT, 26ML

## (undated) DEVICE — PUMP, STRYKERFLOW 2 & HANDPIECE W/10FT. IRRIGATION TUBING

## (undated) DEVICE — SOLUTION, IRRIGATION, USP, SODIUM CHLORIDE 0.9%, 3000 ML

## (undated) DEVICE — STAPLER, ECHELON 3000, 60MM STD

## (undated) DEVICE — SOLUTION KIT, ANTIFOG, 6CC, LF

## (undated) DEVICE — WARMER, DUAL SCOPE

## (undated) DEVICE — GLOVE, SURGICAL, PROTEXIS PI BLUE W/NEUTHERA, 7.0, PF, LF

## (undated) DEVICE — STRAP, VELCRO, BODY, 4 X 60IN, NS

## (undated) DEVICE — SUTURE, MONOCRYL, 4-0, 27 IN, PS-2, UNDYED

## (undated) DEVICE — DRAPE, INCISE, ANTIMICROBIAL, IOBAN 2, LARGE, 17 X 23 IN, DISPOSABLE, STERILE

## (undated) DEVICE — STAPLER, LINEAR ENDO RELOAD, 60MM, BLUE, DISP

## (undated) DEVICE — PAD, GROUNDING, ELECTROSURGICAL, W/9 FT CABLE, POLYHESIVE II, ADULT, LF

## (undated) DEVICE — GLOVE, SURGICAL, PROTEXIS PI , 7.5, PF, LF

## (undated) DEVICE — HOLSTER, JET SAFETY

## (undated) DEVICE — STRAP, ARM BOARD, 32 X 1.5

## (undated) DEVICE — Device

## (undated) DEVICE — NEEDLE, SAFETY, 22 G X 1.5 IN

## (undated) DEVICE — GOWN, SURGICAL, ROYAL SILK, LG, STERILE

## (undated) DEVICE — BAG, TISSUE RETRIEVAL, 10MM, ANCHOR

## (undated) DEVICE — DRAPE, SHEET, ENDOSCOPY, GENERAL, FENESTRATED, ARMBOARD COVER, 98 X 123.5 IN, DISPOSABLE, LF, STERILE

## (undated) DEVICE — TOWEL PACK 10-PK

## (undated) DEVICE — GLOVE, SURGICAL, PROTEXIS PI , 6.5, PF, LF

## (undated) DEVICE — GLOVE, SURGICAL, PROTEXIS PI , 7.0, PF, LF

## (undated) DEVICE — APPLICATOR, COTTON TIP, 6 IN, 2PK, STERILE

## (undated) DEVICE — TUBING, INSUFFLATION, LAPAROSCOPIC, FILTER, 10 FT

## (undated) DEVICE — SOLUTION, IRRIGATION, SODIUM CHLORIDE 0.9%, 1000 ML, POUR BOTTLE